# Patient Record
Sex: MALE | Race: BLACK OR AFRICAN AMERICAN | NOT HISPANIC OR LATINO | URBAN - METROPOLITAN AREA
[De-identification: names, ages, dates, MRNs, and addresses within clinical notes are randomized per-mention and may not be internally consistent; named-entity substitution may affect disease eponyms.]

---

## 2017-01-06 ENCOUNTER — EMERGENCY (EMERGENCY)
Facility: HOSPITAL | Age: 52
LOS: 1 days | Discharge: HOME | End: 2017-01-06
Attending: EMERGENCY MEDICINE | Admitting: EMERGENCY MEDICINE
Payer: MEDICAID

## 2017-01-06 VITALS
HEART RATE: 87 BPM | DIASTOLIC BLOOD PRESSURE: 79 MMHG | RESPIRATION RATE: 18 BRPM | SYSTOLIC BLOOD PRESSURE: 114 MMHG | OXYGEN SATURATION: 98 % | TEMPERATURE: 98 F

## 2017-01-06 VITALS — WEIGHT: 190.04 LBS | HEIGHT: 71 IN

## 2017-01-06 LAB
ALBUMIN SERPL ELPH-MCNC: 4 G/DL — SIGNIFICANT CHANGE UP (ref 3.3–5.2)
ALP SERPL-CCNC: 58 U/L — SIGNIFICANT CHANGE UP (ref 40–120)
ALT FLD-CCNC: 24 U/L — SIGNIFICANT CHANGE UP
ANION GAP SERPL CALC-SCNC: 12 MMOL/L — SIGNIFICANT CHANGE UP (ref 5–17)
ANISOCYTOSIS BLD QL: SLIGHT — SIGNIFICANT CHANGE UP
APPEARANCE UR: CLEAR — SIGNIFICANT CHANGE UP
AST SERPL-CCNC: 43 U/L — HIGH
BACTERIA # UR AUTO: ABNORMAL
BASOPHILS # BLD AUTO: 0 K/UL — SIGNIFICANT CHANGE UP (ref 0–0.2)
BILIRUB SERPL-MCNC: 0.5 MG/DL — SIGNIFICANT CHANGE UP (ref 0.4–2)
BILIRUB UR-MCNC: NEGATIVE — SIGNIFICANT CHANGE UP
BUN SERPL-MCNC: 12 MG/DL — SIGNIFICANT CHANGE UP (ref 8–20)
CALCIUM SERPL-MCNC: 9.1 MG/DL — SIGNIFICANT CHANGE UP (ref 8.6–10.2)
CHLORIDE SERPL-SCNC: 97 MMOL/L — LOW (ref 98–107)
CO2 SERPL-SCNC: 23 MMOL/L — SIGNIFICANT CHANGE UP (ref 22–29)
COLOR SPEC: ABNORMAL
CREAT SERPL-MCNC: 1.06 MG/DL — SIGNIFICANT CHANGE UP (ref 0.5–1.3)
DIFF PNL FLD: ABNORMAL
ELLIPTOCYTES BLD QL SMEAR: SLIGHT — SIGNIFICANT CHANGE UP
EOSINOPHIL # BLD AUTO: 0.2 K/UL — SIGNIFICANT CHANGE UP (ref 0–0.5)
EOSINOPHIL NFR BLD AUTO: 2 % — SIGNIFICANT CHANGE UP (ref 0–6)
GLUCOSE SERPL-MCNC: 78 MG/DL — SIGNIFICANT CHANGE UP (ref 70–115)
GLUCOSE UR QL: NEGATIVE MG/DL — SIGNIFICANT CHANGE UP
HCT VFR BLD CALC: 40.9 % — LOW (ref 42–52)
HGB BLD-MCNC: 14.1 G/DL — SIGNIFICANT CHANGE UP (ref 14–18)
KETONES UR-MCNC: ABNORMAL
LEUKOCYTE ESTERASE UR-ACNC: ABNORMAL
LYMPHOCYTES # BLD AUTO: 1.9 K/UL — SIGNIFICANT CHANGE UP (ref 1–4.8)
LYMPHOCYTES # BLD AUTO: 22 % — SIGNIFICANT CHANGE UP (ref 20–55)
MCHC RBC-ENTMCNC: 22.8 PG — LOW (ref 27–31)
MCHC RBC-ENTMCNC: 34.5 G/DL — SIGNIFICANT CHANGE UP (ref 32–36)
MCV RBC AUTO: 66.2 FL — LOW (ref 80–94)
MICROCYTES BLD QL: SLIGHT — SIGNIFICANT CHANGE UP
MONOCYTES # BLD AUTO: 1.4 K/UL — HIGH (ref 0–0.8)
MONOCYTES NFR BLD AUTO: 15 % — HIGH (ref 3–10)
NEUTROPHILS # BLD AUTO: 5.2 K/UL — SIGNIFICANT CHANGE UP (ref 1.8–8)
NEUTROPHILS NFR BLD AUTO: 61 % — SIGNIFICANT CHANGE UP (ref 37–73)
NITRITE UR-MCNC: POSITIVE
OVALOCYTES BLD QL SMEAR: SLIGHT — SIGNIFICANT CHANGE UP
PH UR: 5 — SIGNIFICANT CHANGE UP (ref 4.8–8)
PLAT MORPH BLD: NORMAL — SIGNIFICANT CHANGE UP
PLATELET # BLD AUTO: 279 K/UL — SIGNIFICANT CHANGE UP (ref 150–400)
POIKILOCYTOSIS BLD QL AUTO: SLIGHT — SIGNIFICANT CHANGE UP
POTASSIUM SERPL-MCNC: 8.3 MMOL/L — CRITICAL HIGH (ref 3.5–5.3)
POTASSIUM SERPL-SCNC: 8.3 MMOL/L — CRITICAL HIGH (ref 3.5–5.3)
PROT SERPL-MCNC: 8.4 G/DL — SIGNIFICANT CHANGE UP (ref 6.6–8.7)
PROT UR-MCNC: 30 MG/DL
RBC # BLD: 6.18 M/UL — SIGNIFICANT CHANGE UP (ref 4.6–6.2)
RBC # FLD: 17.2 % — HIGH (ref 11–15.6)
RBC BLD AUTO: ABNORMAL
RBC CASTS # UR COMP ASSIST: SIGNIFICANT CHANGE UP /HPF (ref 0–4)
SODIUM SERPL-SCNC: 132 MMOL/L — LOW (ref 135–145)
SP GR SPEC: 1.02 — SIGNIFICANT CHANGE UP (ref 1.01–1.02)
UROBILINOGEN FLD QL: 1 MG/DL
WBC # BLD: 8.74 K/UL — SIGNIFICANT CHANGE UP (ref 4.8–10.8)
WBC # FLD AUTO: 8.74 K/UL — SIGNIFICANT CHANGE UP (ref 4.8–10.8)
WBC UR QL: SIGNIFICANT CHANGE UP

## 2017-01-06 PROCEDURE — 74177 CT ABD & PELVIS W/CONTRAST: CPT | Mod: 26

## 2017-01-06 PROCEDURE — 96374 THER/PROPH/DIAG INJ IV PUSH: CPT

## 2017-01-06 PROCEDURE — 80053 COMPREHEN METABOLIC PANEL: CPT

## 2017-01-06 PROCEDURE — 96375 TX/PRO/DX INJ NEW DRUG ADDON: CPT

## 2017-01-06 PROCEDURE — 99284 EMERGENCY DEPT VISIT MOD MDM: CPT

## 2017-01-06 PROCEDURE — 81001 URINALYSIS AUTO W/SCOPE: CPT

## 2017-01-06 PROCEDURE — 99284 EMERGENCY DEPT VISIT MOD MDM: CPT | Mod: 25

## 2017-01-06 PROCEDURE — 74177 CT ABD & PELVIS W/CONTRAST: CPT

## 2017-01-06 PROCEDURE — 85027 COMPLETE CBC AUTOMATED: CPT

## 2017-01-06 RX ORDER — L.ACIDOPH/B.ANIMALIS/B.LONGUM 15B CELL
1 CAPSULE ORAL
Qty: 30 | Refills: 0 | OUTPATIENT
Start: 2017-01-06 | End: 2017-02-05

## 2017-01-06 RX ORDER — L.ACIDOPH/B.ANIMALIS/B.LONGUM 15B CELL
1 CAPSULE ORAL
Qty: 30 | Refills: 0
Start: 2017-01-06 | End: 2017-02-18

## 2017-01-06 RX ORDER — METRONIDAZOLE 500 MG
1 TABLET ORAL
Qty: 42 | Refills: 0
Start: 2017-01-06 | End: 2017-02-02

## 2017-01-06 RX ORDER — CIPROFLOXACIN LACTATE 400MG/40ML
500 VIAL (ML) INTRAVENOUS ONCE
Qty: 0 | Refills: 0 | Status: COMPLETED | OUTPATIENT
Start: 2017-01-06 | End: 2017-01-06

## 2017-01-06 RX ORDER — METRONIDAZOLE 500 MG
1 TABLET ORAL
Qty: 42 | Refills: 0 | OUTPATIENT
Start: 2017-01-06 | End: 2017-01-20

## 2017-01-06 RX ORDER — METRONIDAZOLE 500 MG
500 TABLET ORAL ONCE
Qty: 0 | Refills: 0 | Status: COMPLETED | OUTPATIENT
Start: 2017-01-06 | End: 2017-01-06

## 2017-01-06 RX ORDER — MORPHINE SULFATE 50 MG/1
4 CAPSULE, EXTENDED RELEASE ORAL ONCE
Qty: 0 | Refills: 0 | Status: DISCONTINUED | OUTPATIENT
Start: 2017-01-06 | End: 2017-01-06

## 2017-01-06 RX ORDER — MOXIFLOXACIN HYDROCHLORIDE TABLETS, 400 MG 400 MG/1
1 TABLET, FILM COATED ORAL
Qty: 20 | Refills: 0 | OUTPATIENT
Start: 2017-01-06 | End: 2017-01-16

## 2017-01-06 RX ORDER — MOXIFLOXACIN HYDROCHLORIDE TABLETS, 400 MG 400 MG/1
1 TABLET, FILM COATED ORAL
Qty: 20 | Refills: 0
Start: 2017-01-06 | End: 2017-01-29

## 2017-01-06 RX ORDER — TRAMADOL HYDROCHLORIDE 50 MG/1
1 TABLET ORAL
Qty: 16 | Refills: 0
Start: 2017-01-06 | End: 2017-01-10

## 2017-01-06 RX ORDER — DIPHENHYDRAMINE HCL 50 MG
50 CAPSULE ORAL ONCE
Qty: 0 | Refills: 0 | Status: COMPLETED | OUTPATIENT
Start: 2017-01-06 | End: 2017-01-06

## 2017-01-06 RX ADMIN — Medication 500 MILLIGRAM(S): at 17:43

## 2017-01-06 RX ADMIN — MORPHINE SULFATE 4 MILLIGRAM(S): 50 CAPSULE, EXTENDED RELEASE ORAL at 15:31

## 2017-01-06 RX ADMIN — Medication 50 MILLIGRAM(S): at 15:34

## 2017-01-06 RX ADMIN — MORPHINE SULFATE 4 MILLIGRAM(S): 50 CAPSULE, EXTENDED RELEASE ORAL at 16:01

## 2017-01-06 RX ADMIN — Medication 40 MILLIGRAM(S): at 13:46

## 2017-01-06 NOTE — ED STATDOCS - PROGRESS NOTE DETAILS
abd/pel=rt sided periumbilical and RUQ pain with palp,guarding,no rebound,normal bowel sounds,no CVA pain with palp,no eccymosis,no mass or bruit--lungs=CTA---will f/u all testings----pt states that he last ate thursday pt agitated--stating that its taking too long to get cat scan--states that he is thinking of leaving and going to another hospital pt now demanding morphine--d/w pt I will give him something for pain--pt then stated that he was leaving to go to another hospital---pt left before iv could be removed---code flight called---will call police due to IV still being in arm pt returned--now wants CT done--as per dr bruce will do CT and pt will get morphine for pain CT=ascending colon uncomplicated diverticulitis---cipro,flagyl,tramadol and probiotic---f/u with GI---CMP was hemolized,no need to repeat as per dr bruce---d/w dr ha all results prior to discharge CT=ascending colon uncomplicated diverticulitis---cipro,flagyl,tramadol and probiotic---f/u with GI---CMP was hemolized,no need to repeat as per dr bruce---d/w dr ha all results prior to discharge--will f/u urine cx

## 2017-01-06 NOTE — ED STATDOCS - ATTENDING CONTRIBUTION TO CARE
I, Burton Lao, performed the initial face to face bedside interview with this patient regarding history of present illness, review of symptoms and relevant past medical, social and family history.  I completed an independent physical examination.  I was the provider who initially evaluated this patient.  The history, relevant review of systems, past medical and surgical history, medical decision making, and physical examination was documented by the scribe in my presence and I attest to the accuracy of the documentation. Follow-up on ordered tests (ie labs, radiologic studies) and re-evaluation of the patient's status has been communicated to the ACP.  Disposition of the patient will be based on test outcome and response to ED interventions.

## 2017-01-06 NOTE — ED ADULT NURSE NOTE - OBJECTIVE STATEMENT
pt arrived with right upper quad pain that started 6 days ago and has gotten worse since last night, pt with history of diverticulitis

## 2017-01-06 NOTE — ED STATDOCS - OBJECTIVE STATEMENT
50 y/o M pt w/ PMHx of dyspepsia presents to the ED c/o RUQ pain x6 days, worsening yesterday night. Pt states that his pain is constant, and waxing and waning. Pt was seen here 2 months ago for diverticulitis, and pt states the pain is comparable. Pt denies fatty food intolerance, N/V/D, or any other complaints. Pt is allergic to Iodine. PMD: Dr. Rodriguez. Pt states that he took pain medication to no relief. Pt's pain is worsened w/ deep breathing. 52 y/o M pt w/ PMHx of dyspepsia presents to the ED c/o RUQ pain x6 days, worsening yesterday night. Pt states that his pain is waxing and waning in intensity and worse at night.  Denies aggravating factors. Pt denies fatty food intolerance, fever, N/V/D, or any other complaints.Took antacid and nexium with some relief.  Pt was seen here 2 months ago for diverticulitis, and pt states the pain is comparable.  Pt is allergic to Iodine. PMD: Dr. Rodriguez.

## 2017-01-06 NOTE — ED ADULT NURSE REASSESSMENT NOTE - NS ED NURSE REASSESS COMMENT FT1
was informed by lab blood hemolyzed, dr bruce informed, as per dr bruce blood does not need to bwe redrawn

## 2017-01-06 NOTE — ED STATDOCS - NS ED MD SCRIBE ATTENDING SCRIBE SECTIONS
HIV/DISPOSITION/PAST MEDICAL/SURGICAL/SOCIAL HISTORY/VITAL SIGNS( Pullset)/PHYSICAL EXAM/REVIEW OF SYSTEMS/HISTORY OF PRESENT ILLNESS

## 2017-04-24 ENCOUNTER — TRANSCRIPTION ENCOUNTER (OUTPATIENT)
Age: 52
End: 2017-04-24

## 2018-01-16 ENCOUNTER — TRANSCRIPTION ENCOUNTER (OUTPATIENT)
Age: 53
End: 2018-01-16

## 2018-01-16 PROBLEM — R10.13 EPIGASTRIC PAIN: Chronic | Status: ACTIVE | Noted: 2017-01-11

## 2018-01-16 PROBLEM — K57.92 DIVERTICULITIS OF INTESTINE, PART UNSPECIFIED, WITHOUT PERFORATION OR ABSCESS WITHOUT BLEEDING: Chronic | Status: ACTIVE | Noted: 2017-01-11

## 2018-01-17 ENCOUNTER — OUTPATIENT (OUTPATIENT)
Dept: OUTPATIENT SERVICES | Facility: HOSPITAL | Age: 53
LOS: 1 days | End: 2018-01-17
Payer: MEDICAID

## 2018-01-17 ENCOUNTER — RESULT REVIEW (OUTPATIENT)
Age: 53
End: 2018-01-17

## 2018-01-17 DIAGNOSIS — K21.0 GASTRO-ESOPHAGEAL REFLUX DISEASE WITH ESOPHAGITIS: ICD-10-CM

## 2018-01-17 DIAGNOSIS — Z12.11 ENCOUNTER FOR SCREENING FOR MALIGNANT NEOPLASM OF COLON: ICD-10-CM

## 2018-01-17 PROCEDURE — 88342 IMHCHEM/IMCYTCHM 1ST ANTB: CPT

## 2018-01-17 PROCEDURE — 88305 TISSUE EXAM BY PATHOLOGIST: CPT

## 2018-01-17 PROCEDURE — 88342 IMHCHEM/IMCYTCHM 1ST ANTB: CPT | Mod: 26

## 2018-01-17 PROCEDURE — 88305 TISSUE EXAM BY PATHOLOGIST: CPT | Mod: 26

## 2018-01-17 PROCEDURE — 43239 EGD BIOPSY SINGLE/MULTIPLE: CPT

## 2018-01-17 PROCEDURE — G0121: CPT

## 2018-01-19 LAB — SURGICAL PATHOLOGY FINAL REPORT - CH: SIGNIFICANT CHANGE UP

## 2018-03-01 ENCOUNTER — OUTPATIENT (OUTPATIENT)
Dept: OUTPATIENT SERVICES | Facility: HOSPITAL | Age: 53
LOS: 1 days | End: 2018-03-01
Payer: MEDICAID

## 2018-03-01 PROCEDURE — G9001: CPT

## 2018-03-16 DIAGNOSIS — R69 ILLNESS, UNSPECIFIED: ICD-10-CM

## 2021-04-20 PROBLEM — Z00.00 ENCOUNTER FOR PREVENTIVE HEALTH EXAMINATION: Status: ACTIVE | Noted: 2021-04-20

## 2021-05-04 ENCOUNTER — APPOINTMENT (OUTPATIENT)
Dept: COLORECTAL SURGERY | Facility: CLINIC | Age: 56
End: 2021-05-04
Payer: COMMERCIAL

## 2021-05-04 VITALS
HEART RATE: 82 BPM | HEIGHT: 71 IN | TEMPERATURE: 97.9 F | RESPIRATION RATE: 16 BRPM | WEIGHT: 182 LBS | DIASTOLIC BLOOD PRESSURE: 73 MMHG | BODY MASS INDEX: 25.48 KG/M2 | SYSTOLIC BLOOD PRESSURE: 110 MMHG

## 2021-05-04 DIAGNOSIS — F17.200 NICOTINE DEPENDENCE, UNSPECIFIED, UNCOMPLICATED: ICD-10-CM

## 2021-05-04 DIAGNOSIS — Z82.49 FAMILY HISTORY OF ISCHEMIC HEART DISEASE AND OTHER DISEASES OF THE CIRCULATORY SYSTEM: ICD-10-CM

## 2021-05-04 DIAGNOSIS — R10.32 LEFT LOWER QUADRANT PAIN: ICD-10-CM

## 2021-05-04 DIAGNOSIS — Z86.010 PERSONAL HISTORY OF COLONIC POLYPS: ICD-10-CM

## 2021-05-04 DIAGNOSIS — K57.32 DIVERTICULITIS OF LARGE INTESTINE W/OUT PERFORATION OR ABSCESS W/OUT BLEEDING: ICD-10-CM

## 2021-05-04 DIAGNOSIS — Z72.89 OTHER PROBLEMS RELATED TO LIFESTYLE: ICD-10-CM

## 2021-05-04 PROCEDURE — 99204 OFFICE O/P NEW MOD 45 MIN: CPT

## 2021-05-04 PROCEDURE — 99072 ADDL SUPL MATRL&STAF TM PHE: CPT

## 2021-05-04 NOTE — DATA REVIEWED
[FreeTextEntry1] : Previous CAT scan demonstrates diverticulitis\par Colonoscopy demonstrates diverticulosis

## 2021-05-04 NOTE — PHYSICAL EXAM
[Abdomen Tenderness] : ~T ~M Abdominal tenderness [Normal Breath Sounds] : Normal breath sounds [Normal Heart Sounds] : normal heart sounds [Normal Rate and Rhythm] : normal rate and rhythm [No Rash or Lesion] : No rash or lesion [Alert] : alert [Oriented to Person] : oriented to person [Oriented to Place] : oriented to place [Oriented to Time] : oriented to time [Calm] : calm [Abdomen Masses] : No abdominal masses [JVD] : no jugular venous distention  [de-identified] : llq quadrant discomfort [de-identified] : Looks in moderate distress , of stated age. [de-identified] : pupils equal reactive to light normocephalic atraumatic. [de-identified] : moves all 4 extremities appropriately with 5 over 5 strength

## 2021-05-04 NOTE — REVIEW OF SYSTEMS
[Feeling Poorly] : feeling poorly [Feeling Tired] : feeling tired [As Noted in HPI] : as noted in HPI [Abdominal Pain] : abdominal pain [Constipation] : constipation [Negative] : Heme/Lymph [FreeTextEntry7] : Chronic left lower quadrant abdominal pain

## 2021-05-04 NOTE — HISTORY OF PRESENT ILLNESS
[FreeTextEntry1] : consultation by Dr. Modi for recurring episodes of sigmoid diverticulitis and chronic left lower quadrant abdominal pain. 56-year-old male who had multiple bouts of sigmoid diverticulitis, 10 episodes over the last 10 years has developed chronic left lower quadrant abdominal pain symptoms have been worsening

## 2021-05-04 NOTE — ASSESSMENT
[FreeTextEntry1] : 56-year-old male with recurrent episodes of sigmoid diverticulitis and now chronic left lower quadrant abdominal pain. Recommend repeat CAT scan abdomen and pelvis p.o. contrast patient has a severe allergy to IV. Recommend colonoscopy. Recommend colonoscopy. Risks and benefits of colonoscopy have been discussed which include but not limited to bleeding, perforation, missing a cancer or polyp occurring 5%.\par Recommend laparoscopic possible open sigmoid colon resection.Risk and benefits of the surgery have been discussed which include bleeding, infection, sepsis, multiorgan failure, inadvertent injury including hollow viscus, solid organ, ureter neurovascular and neurological structures, DVT PE, heart attack, stroke, hernias, recurrence, leakage of anastomosis requiring reoperation possible stoma and death.

## 2021-05-05 PROBLEM — Z86.010 HISTORY OF COLONIC POLYPS: Status: RESOLVED | Noted: 2021-05-04 | Resolved: 2021-05-05

## 2021-05-05 PROBLEM — Z72.89 ALCOHOL USE: Status: ACTIVE | Noted: 2021-05-04

## 2021-05-08 ENCOUNTER — RESULT REVIEW (OUTPATIENT)
Age: 56
End: 2021-05-08

## 2021-05-10 ENCOUNTER — APPOINTMENT (OUTPATIENT)
Dept: COLORECTAL SURGERY | Facility: CLINIC | Age: 56
End: 2021-05-10
Payer: COMMERCIAL

## 2021-05-10 DIAGNOSIS — Z01.818 ENCOUNTER FOR OTHER PREPROCEDURAL EXAMINATION: ICD-10-CM

## 2021-05-10 PROCEDURE — 45378 DIAGNOSTIC COLONOSCOPY: CPT

## 2021-05-12 ENCOUNTER — NON-APPOINTMENT (OUTPATIENT)
Age: 56
End: 2021-05-12

## 2021-05-12 ENCOUNTER — APPOINTMENT (OUTPATIENT)
Dept: CT IMAGING | Facility: CLINIC | Age: 56
End: 2021-05-12
Payer: COMMERCIAL

## 2021-05-12 ENCOUNTER — OUTPATIENT (OUTPATIENT)
Dept: OUTPATIENT SERVICES | Facility: HOSPITAL | Age: 56
LOS: 1 days | End: 2021-05-12

## 2021-05-12 DIAGNOSIS — K57.32 DIVERTICULITIS OF LARGE INTESTINE WITHOUT PERFORATION OR ABSCESS WITHOUT BLEEDING: ICD-10-CM

## 2021-05-12 PROCEDURE — 74176 CT ABD & PELVIS W/O CONTRAST: CPT | Mod: 26

## 2021-05-13 ENCOUNTER — RESULT REVIEW (OUTPATIENT)
Age: 56
End: 2021-05-13

## 2021-05-13 ENCOUNTER — OUTPATIENT (OUTPATIENT)
Dept: OUTPATIENT SERVICES | Facility: HOSPITAL | Age: 56
LOS: 1 days | End: 2021-05-13
Payer: COMMERCIAL

## 2021-05-13 VITALS
HEIGHT: 71 IN | WEIGHT: 188.05 LBS | DIASTOLIC BLOOD PRESSURE: 65 MMHG | RESPIRATION RATE: 16 BRPM | HEART RATE: 95 BPM | SYSTOLIC BLOOD PRESSURE: 108 MMHG | OXYGEN SATURATION: 100 % | TEMPERATURE: 98 F

## 2021-05-13 DIAGNOSIS — Z29.9 ENCOUNTER FOR PROPHYLACTIC MEASURES, UNSPECIFIED: ICD-10-CM

## 2021-05-13 DIAGNOSIS — Z01.818 ENCOUNTER FOR OTHER PREPROCEDURAL EXAMINATION: ICD-10-CM

## 2021-05-13 DIAGNOSIS — K57.32 DIVERTICULITIS OF LARGE INTESTINE WITHOUT PERFORATION OR ABSCESS WITHOUT BLEEDING: ICD-10-CM

## 2021-05-13 DIAGNOSIS — Z98.890 OTHER SPECIFIED POSTPROCEDURAL STATES: Chronic | ICD-10-CM

## 2021-05-13 LAB
A1C WITH ESTIMATED AVERAGE GLUCOSE RESULT: 6.2 % — HIGH (ref 4–5.6)
ALBUMIN SERPL ELPH-MCNC: 3 G/DL — LOW (ref 3.3–5)
ALP SERPL-CCNC: 86 U/L — SIGNIFICANT CHANGE UP (ref 40–120)
ALT FLD-CCNC: 29 U/L — SIGNIFICANT CHANGE UP (ref 12–78)
ANION GAP SERPL CALC-SCNC: 4 MMOL/L — LOW (ref 5–17)
APTT BLD: 31.6 SEC — SIGNIFICANT CHANGE UP (ref 27.5–35.5)
AST SERPL-CCNC: 11 U/L — LOW (ref 15–37)
BASOPHILS # BLD AUTO: 0 K/UL — SIGNIFICANT CHANGE UP (ref 0–0.2)
BASOPHILS NFR BLD AUTO: 0 % — SIGNIFICANT CHANGE UP (ref 0–2)
BILIRUB SERPL-MCNC: 0.2 MG/DL — SIGNIFICANT CHANGE UP (ref 0.2–1.2)
BUN SERPL-MCNC: 6 MG/DL — LOW (ref 7–23)
CALCIUM SERPL-MCNC: 8.4 MG/DL — LOW (ref 8.5–10.1)
CHLORIDE SERPL-SCNC: 107 MMOL/L — SIGNIFICANT CHANGE UP (ref 96–108)
CO2 SERPL-SCNC: 27 MMOL/L — SIGNIFICANT CHANGE UP (ref 22–31)
CREAT SERPL-MCNC: 0.93 MG/DL — SIGNIFICANT CHANGE UP (ref 0.5–1.3)
EOSINOPHIL # BLD AUTO: 2.24 K/UL — HIGH (ref 0–0.5)
EOSINOPHIL NFR BLD AUTO: 23 % — HIGH (ref 0–6)
ESTIMATED AVERAGE GLUCOSE: 131 MG/DL — HIGH (ref 68–114)
GLUCOSE SERPL-MCNC: 101 MG/DL — HIGH (ref 70–99)
HCT VFR BLD CALC: 37.8 % — LOW (ref 39–50)
HGB BLD-MCNC: 12.1 G/DL — LOW (ref 13–17)
INR BLD: 1.21 RATIO — HIGH (ref 0.88–1.16)
LYMPHOCYTES # BLD AUTO: 1.66 K/UL — SIGNIFICANT CHANGE UP (ref 1–3.3)
LYMPHOCYTES # BLD AUTO: 17 % — SIGNIFICANT CHANGE UP (ref 13–44)
MCHC RBC-ENTMCNC: 22.2 PG — LOW (ref 27–34)
MCHC RBC-ENTMCNC: 32 GM/DL — SIGNIFICANT CHANGE UP (ref 32–36)
MCV RBC AUTO: 69.4 FL — LOW (ref 80–100)
MONOCYTES # BLD AUTO: 0.49 K/UL — SIGNIFICANT CHANGE UP (ref 0–0.9)
MONOCYTES NFR BLD AUTO: 5 % — SIGNIFICANT CHANGE UP (ref 2–14)
NEUTROPHILS # BLD AUTO: 5.36 K/UL — SIGNIFICANT CHANGE UP (ref 1.8–7.4)
NEUTROPHILS NFR BLD AUTO: 55 % — SIGNIFICANT CHANGE UP (ref 43–77)
NRBC # BLD: SIGNIFICANT CHANGE UP /100 WBCS (ref 0–0)
PLATELET # BLD AUTO: 199 K/UL — SIGNIFICANT CHANGE UP (ref 150–400)
POTASSIUM SERPL-MCNC: 3.5 MMOL/L — SIGNIFICANT CHANGE UP (ref 3.5–5.3)
POTASSIUM SERPL-SCNC: 3.5 MMOL/L — SIGNIFICANT CHANGE UP (ref 3.5–5.3)
PROT SERPL-MCNC: 6.4 GM/DL — SIGNIFICANT CHANGE UP (ref 6–8.3)
PROTHROM AB SERPL-ACNC: 14 SEC — HIGH (ref 10.6–13.6)
RBC # BLD: 5.45 M/UL — SIGNIFICANT CHANGE UP (ref 4.2–5.8)
RBC # FLD: 17.1 % — HIGH (ref 10.3–14.5)
SODIUM SERPL-SCNC: 138 MMOL/L — SIGNIFICANT CHANGE UP (ref 135–145)
WBC # BLD: 9.74 K/UL — SIGNIFICANT CHANGE UP (ref 3.8–10.5)
WBC # FLD AUTO: 9.74 K/UL — SIGNIFICANT CHANGE UP (ref 3.8–10.5)

## 2021-05-13 PROCEDURE — 71046 X-RAY EXAM CHEST 2 VIEWS: CPT | Mod: 26

## 2021-05-13 PROCEDURE — 86901 BLOOD TYPING SEROLOGIC RH(D): CPT

## 2021-05-13 PROCEDURE — 93010 ELECTROCARDIOGRAM REPORT: CPT

## 2021-05-13 PROCEDURE — 36415 COLL VENOUS BLD VENIPUNCTURE: CPT

## 2021-05-13 PROCEDURE — 71046 X-RAY EXAM CHEST 2 VIEWS: CPT

## 2021-05-13 PROCEDURE — 93005 ELECTROCARDIOGRAM TRACING: CPT

## 2021-05-13 PROCEDURE — 85610 PROTHROMBIN TIME: CPT

## 2021-05-13 PROCEDURE — 86850 RBC ANTIBODY SCREEN: CPT

## 2021-05-13 PROCEDURE — G0463: CPT | Mod: 25

## 2021-05-13 PROCEDURE — 82248 BILIRUBIN DIRECT: CPT

## 2021-05-13 PROCEDURE — 85730 THROMBOPLASTIN TIME PARTIAL: CPT

## 2021-05-13 PROCEDURE — 85025 COMPLETE CBC W/AUTO DIFF WBC: CPT

## 2021-05-13 PROCEDURE — 86900 BLOOD TYPING SEROLOGIC ABO: CPT

## 2021-05-13 PROCEDURE — 83036 HEMOGLOBIN GLYCOSYLATED A1C: CPT

## 2021-05-13 PROCEDURE — 80053 COMPREHEN METABOLIC PANEL: CPT

## 2021-05-13 NOTE — H&P PST ADULT - ASSESSMENT
56 year old male with diverticulitis; s/p antibiotic treatment denies abdominal pain or blood in stool; he presents to PST for planned laparoscopic possible open sigmoid colon resection     Plan:  1. PST instructions given ; NPO status instructions to be given by ASU   2. Pt instructed to take following meds with sip of water : none   3. Pt instructed to take routine evening medications unless indicated   4. Stop NSAIDS ( Aspirin Alev Motrin Mobic Diclofenac), herbal supplements , MVI , Vitamin fish oil 7 days prior to surgery  unless   directed by surgeon or cardiologist;   5. Medical Optimization  with NP Leena Modi   6. EZ wash instructions given   7. Labs EKG CXR as per surgeon request   8. Pt instructed to self quarantine after Covid test   9. Covid Testing scheduled Pt notified and aware  10. Pt denies covid symptoms shortness of breath fever cough       CAPRINI SCORE [CLOT]    AGE RELATED RISK FACTORS                                                       MOBILITY RELATED FACTORS  [x ] Age 41-60 years                                            (1 Point)                  [ ] Bed rest                                                        (1 Point)  [ ] Age: 61-74 years                                           (2 Points)                 [ ] Plaster cast                                                   (2 Points)  [ ] Age= 75 years                                              (3 Points)                 [ ] Bed bound for more than 72 hours                 (2 Points)    DISEASE RELATED RISK FACTORS                                               GENDER SPECIFIC FACTORS  [ ] Edema in the lower extremities                       (1 Point)                  [ ] Pregnancy                                                     (1 Point)  [ ] Varicose veins                                               (1 Point)                  [ ] Post-partum < 6 weeks                                   (1 Point)             [ x] BMI > 25 Kg/m2                                            (1 Point)                  [ ] Hormonal therapy  or oral contraception          (1 Point)                 [ ] Sepsis (in the previous month)                        (1 Point)                  [ ] History of pregnancy complications                 (1 point)  [ ] Pneumonia or serious lung disease                                               [ ] Unexplained or recurrent                     (1 Point)           (in the previous month)                               (1 Point)  [ ] Abnormal pulmonary function test                     (1 Point)                 SURGERY RELATED RISK FACTORS  [ ] Acute myocardial infarction                              (1 Point)                 [ ]  Section                                             (1 Point)  [ ] Congestive heart failure (in the previous month)  (1 Point)               [ ] Minor surgery                                                  (1 Point)   [x ] Inflammatory bowel disease                             (1 Point)                 [ ] Arthroscopic surgery                                        (2 Points)  [ ] Central venous access                                      (2 Points)                [x ] General surgery lasting more than 45 minutes   (2 Points)       [ ] Stroke (in the previous month)                          (5 Points)               [ ] Elective arthroplasty                                         (5 Points)            ( ) presents and past malignancy                           (2 points)                                                                                                         HEMATOLOGY RELATED FACTORS                                                 TRAUMA RELATED RISK FACTORS  [ ] Prior episodes of VTE                                     (3 Points)                 [ ] Fracture of the hip, pelvis, or leg                       (5 Points)  [ ] Positive family history for VTE                         (3 Points)                 [ ] Acute spinal cord injury (in the previous month)  (5 Points)  [ ] Prothrombin 53211 A                                     (3 Points)                 [ ] Paralysis  (less than 1 month)                             (5 Points)  [ ] Factor V Leiden                                             (3 Points)                  [ ] Multiple Trauma within 1 month                        (5 Points)  [ ] Lupus anticoagulants                                     (3 Points)                                                           [ ] Anticardiolipin antibodies                               (3 Points)                                                       [ ] High homocysteine in the blood                      (3 Points)                                             [ ] Other congenital or acquired thrombophilia      (3 Points)                                                [ ] Heparin induced thrombocytopenia                  (3 Points)                                          Total Score [   5       ]

## 2021-05-13 NOTE — H&P PST ADULT - HEALTH CARE MAINTENANCE
covid vaccine current moderna x2   [place] Pt denies  travel internationally for the past 21 days

## 2021-05-13 NOTE — H&P PST ADULT - NSICDXFAMILYHX_GEN_ALL_CORE_FT
FAMILY HISTORY:  Father  Still living? Unknown  Family history of benign spinal cord tumor, Age at diagnosis: Age Unknown  FH: hypertension, Age at diagnosis: Age Unknown    Mother  Still living? Unknown  FH: diabetes mellitus, Age at diagnosis: Age Unknown  FH: lung cancer, Age at diagnosis: Age Unknown

## 2021-05-13 NOTE — H&P PST ADULT - HISTORY OF PRESENT ILLNESS
56 year old male with diverticulitis; s/p antibiotic treatment denies abdominal pain or blood in stool; he presents to PST for planned laparoscopic possible open sigmoid colon resection

## 2021-05-13 NOTE — H&P PST ADULT - NSANTHOSAYNRD_GEN_A_CORE
No. LEODAN screening performed.  STOP BANG Legend: 0-2 = LOW Risk; 3-4 = INTERMEDIATE Risk; 5-8 = HIGH Risk

## 2021-05-14 ENCOUNTER — APPOINTMENT (OUTPATIENT)
Dept: DISASTER EMERGENCY | Facility: CLINIC | Age: 56
End: 2021-05-14

## 2021-05-14 DIAGNOSIS — Z01.818 ENCOUNTER FOR OTHER PREPROCEDURAL EXAMINATION: ICD-10-CM

## 2021-05-14 DIAGNOSIS — K57.32 DIVERTICULITIS OF LARGE INTESTINE WITHOUT PERFORATION OR ABSCESS WITHOUT BLEEDING: ICD-10-CM

## 2021-05-14 RX ORDER — ONDANSETRON 8 MG/1
4 TABLET, FILM COATED ORAL ONCE
Refills: 0 | Status: DISCONTINUED | OUTPATIENT
Start: 2021-05-17 | End: 2021-05-17

## 2021-05-14 RX ORDER — HYDROMORPHONE HYDROCHLORIDE 2 MG/ML
0.5 INJECTION INTRAMUSCULAR; INTRAVENOUS; SUBCUTANEOUS
Refills: 0 | Status: DISCONTINUED | OUTPATIENT
Start: 2021-05-17 | End: 2021-05-17

## 2021-05-14 RX ORDER — SODIUM CHLORIDE 9 MG/ML
1000 INJECTION, SOLUTION INTRAVENOUS
Refills: 0 | Status: DISCONTINUED | OUTPATIENT
Start: 2021-05-17 | End: 2021-05-17

## 2021-05-15 LAB — SARS-COV-2 N GENE NPH QL NAA+PROBE: NOT DETECTED

## 2021-05-17 ENCOUNTER — INPATIENT (INPATIENT)
Facility: HOSPITAL | Age: 56
LOS: 1 days | Discharge: HOME | DRG: 330 | End: 2021-05-19
Attending: COLON & RECTAL SURGERY | Admitting: COLON & RECTAL SURGERY
Payer: COMMERCIAL

## 2021-05-17 ENCOUNTER — RESULT REVIEW (OUTPATIENT)
Age: 56
End: 2021-05-17

## 2021-05-17 ENCOUNTER — APPOINTMENT (OUTPATIENT)
Dept: COLORECTAL SURGERY | Facility: HOSPITAL | Age: 56
End: 2021-05-17
Payer: COMMERCIAL

## 2021-05-17 VITALS
RESPIRATION RATE: 16 BRPM | TEMPERATURE: 98 F | WEIGHT: 188.05 LBS | OXYGEN SATURATION: 100 % | DIASTOLIC BLOOD PRESSURE: 67 MMHG | SYSTOLIC BLOOD PRESSURE: 104 MMHG | HEIGHT: 71 IN | HEART RATE: 55 BPM

## 2021-05-17 DIAGNOSIS — K57.32 DIVERTICULITIS OF LARGE INTESTINE WITHOUT PERFORATION OR ABSCESS WITHOUT BLEEDING: ICD-10-CM

## 2021-05-17 DIAGNOSIS — R10.32 LEFT LOWER QUADRANT PAIN: ICD-10-CM

## 2021-05-17 DIAGNOSIS — Z98.890 OTHER SPECIFIED POSTPROCEDURAL STATES: Chronic | ICD-10-CM

## 2021-05-17 LAB
APTT BLD: 32.2 SEC — SIGNIFICANT CHANGE UP (ref 27.5–35.5)
INR BLD: 1.18 RATIO — HIGH (ref 0.88–1.16)
PROTHROM AB SERPL-ACNC: 13.7 SEC — HIGH (ref 10.6–13.6)

## 2021-05-17 PROCEDURE — 44213 LAP MOBIL SPLENIC FL ADD-ON: CPT | Mod: AS

## 2021-05-17 PROCEDURE — 87077 CULTURE AEROBIC IDENTIFY: CPT

## 2021-05-17 PROCEDURE — 36415 COLL VENOUS BLD VENIPUNCTURE: CPT

## 2021-05-17 PROCEDURE — 44207 L COLECTOMY/COLOPROCTOSTOMY: CPT | Mod: AS

## 2021-05-17 PROCEDURE — 45300 PROCTOSIGMOIDOSCOPY DX: CPT

## 2021-05-17 PROCEDURE — 88304 TISSUE EXAM BY PATHOLOGIST: CPT | Mod: 26

## 2021-05-17 PROCEDURE — 44207 L COLECTOMY/COLOPROCTOSTOMY: CPT

## 2021-05-17 PROCEDURE — 88307 TISSUE EXAM BY PATHOLOGIST: CPT | Mod: 26

## 2021-05-17 PROCEDURE — 44640 REPAIR BOWEL-SKIN FISTULA: CPT | Mod: AS

## 2021-05-17 PROCEDURE — 85610 PROTHROMBIN TIME: CPT

## 2021-05-17 PROCEDURE — 87070 CULTURE OTHR SPECIMN AEROBIC: CPT

## 2021-05-17 PROCEDURE — 87075 CULTR BACTERIA EXCEPT BLOOD: CPT

## 2021-05-17 PROCEDURE — 85730 THROMBOPLASTIN TIME PARTIAL: CPT

## 2021-05-17 PROCEDURE — 85027 COMPLETE CBC AUTOMATED: CPT

## 2021-05-17 PROCEDURE — 99232 SBSQ HOSP IP/OBS MODERATE 35: CPT

## 2021-05-17 PROCEDURE — 83735 ASSAY OF MAGNESIUM: CPT

## 2021-05-17 PROCEDURE — 80048 BASIC METABOLIC PNL TOTAL CA: CPT

## 2021-05-17 PROCEDURE — 88307 TISSUE EXAM BY PATHOLOGIST: CPT

## 2021-05-17 PROCEDURE — 44640 REPAIR BOWEL-SKIN FISTULA: CPT

## 2021-05-17 PROCEDURE — 82962 GLUCOSE BLOOD TEST: CPT

## 2021-05-17 PROCEDURE — 49020 DRAINAGE ABDOM ABSCESS OPEN: CPT

## 2021-05-17 PROCEDURE — C1889: CPT

## 2021-05-17 PROCEDURE — 49020 DRAINAGE ABDOM ABSCESS OPEN: CPT | Mod: AS

## 2021-05-17 PROCEDURE — 44213 LAP MOBIL SPLENIC FL ADD-ON: CPT

## 2021-05-17 PROCEDURE — 84100 ASSAY OF PHOSPHORUS: CPT

## 2021-05-17 PROCEDURE — 88304 TISSUE EXAM BY PATHOLOGIST: CPT

## 2021-05-17 PROCEDURE — 87186 SC STD MICRODIL/AGAR DIL: CPT

## 2021-05-17 RX ORDER — ALVIMOPAN 12 MG/1
12 CAPSULE ORAL ONCE
Refills: 0 | Status: COMPLETED | OUTPATIENT
Start: 2021-05-17 | End: 2021-05-17

## 2021-05-17 RX ORDER — HEPARIN SODIUM 5000 [USP'U]/ML
5000 INJECTION INTRAVENOUS; SUBCUTANEOUS ONCE
Refills: 0 | Status: COMPLETED | OUTPATIENT
Start: 2021-05-17 | End: 2021-05-17

## 2021-05-17 RX ORDER — OXYCODONE HYDROCHLORIDE 5 MG/1
10 TABLET ORAL EVERY 4 HOURS
Refills: 0 | Status: DISCONTINUED | OUTPATIENT
Start: 2021-05-17 | End: 2021-05-19

## 2021-05-17 RX ORDER — CEFOTETAN DISODIUM 1 G
2 VIAL (EA) INJECTION ONCE
Refills: 0 | Status: COMPLETED | OUTPATIENT
Start: 2021-05-18 | End: 2021-05-18

## 2021-05-17 RX ORDER — SODIUM CHLORIDE 9 MG/ML
1000 INJECTION, SOLUTION INTRAVENOUS
Refills: 0 | Status: DISCONTINUED | OUTPATIENT
Start: 2021-05-17 | End: 2021-05-19

## 2021-05-17 RX ORDER — ALVIMOPAN 12 MG/1
12 CAPSULE ORAL
Refills: 0 | Status: DISCONTINUED | OUTPATIENT
Start: 2021-05-17 | End: 2021-05-19

## 2021-05-17 RX ORDER — OXYCODONE HYDROCHLORIDE 5 MG/1
5 TABLET ORAL EVERY 4 HOURS
Refills: 0 | Status: DISCONTINUED | OUTPATIENT
Start: 2021-05-17 | End: 2021-05-19

## 2021-05-17 RX ORDER — ACETAMINOPHEN 500 MG
1000 TABLET ORAL EVERY 6 HOURS
Refills: 0 | Status: COMPLETED | OUTPATIENT
Start: 2021-05-17 | End: 2021-05-18

## 2021-05-17 RX ORDER — HEPARIN SODIUM 5000 [USP'U]/ML
5000 INJECTION INTRAVENOUS; SUBCUTANEOUS EVERY 8 HOURS
Refills: 0 | Status: DISCONTINUED | OUTPATIENT
Start: 2021-05-18 | End: 2021-05-19

## 2021-05-17 RX ORDER — ACETAMINOPHEN 500 MG
650 TABLET ORAL EVERY 6 HOURS
Refills: 0 | Status: DISCONTINUED | OUTPATIENT
Start: 2021-05-17 | End: 2021-05-19

## 2021-05-17 RX ADMIN — HYDROMORPHONE HYDROCHLORIDE 0.5 MILLIGRAM(S): 2 INJECTION INTRAMUSCULAR; INTRAVENOUS; SUBCUTANEOUS at 16:23

## 2021-05-17 RX ADMIN — HEPARIN SODIUM 5000 UNIT(S): 5000 INJECTION INTRAVENOUS; SUBCUTANEOUS at 08:11

## 2021-05-17 RX ADMIN — Medication 1000 MILLIGRAM(S): at 16:23

## 2021-05-17 RX ADMIN — Medication 400 MILLIGRAM(S): at 16:00

## 2021-05-17 RX ADMIN — Medication 400 MILLIGRAM(S): at 22:53

## 2021-05-17 RX ADMIN — ALVIMOPAN 12 MILLIGRAM(S): 12 CAPSULE ORAL at 22:54

## 2021-05-17 RX ADMIN — HYDROMORPHONE HYDROCHLORIDE 0.5 MILLIGRAM(S): 2 INJECTION INTRAMUSCULAR; INTRAVENOUS; SUBCUTANEOUS at 12:35

## 2021-05-17 RX ADMIN — Medication 1000 MILLIGRAM(S): at 23:34

## 2021-05-17 RX ADMIN — ALVIMOPAN 12 MILLIGRAM(S): 12 CAPSULE ORAL at 08:27

## 2021-05-17 RX ADMIN — SODIUM CHLORIDE 125 MILLILITER(S): 9 INJECTION, SOLUTION INTRAVENOUS at 22:53

## 2021-05-17 NOTE — CONSULT NOTE ADULT - SUBJECTIVE AND OBJECTIVE BOX
PCP:  Dr. Marla Cesar  Colorectal:  Se    CHIEF COMPLAINT:   left lower quadrant pain    HISTORY OF THE PRESENT ILLNESS:  56 M with Hx of Diverticulosis complicated by several episodes of recurrent sigmoid diverticulitis, approx 10 episodes over the last 10 years associated with chronic LLQ pain.  His symptoms have been getting progressively worse and he has been on several rounds of antibiotic treatments.  He was re-evaluated and a decision was made to undergo laproscopic sigmoid resection with Dr. Saez today.  The patient is now in the PACU and vitals have been stable post-op.    PAST MEDICAL HISTORY:  Recurrent Sigmoid Diverticulitis  Sigmoid Diverticulosis  Vitamin D deficiency    PAST SURGICAL HISTORY:  NONE    FAMILY HISTORY:    Mother - aortic aneursym    SOCIAL HISTORY:  + smoking, no alcohol, no drugs    REVIEW OF SYSTEMS:   All 10 systems reviewed in detailed and found to be negative with the exception of what has already been described above    MEDICATIONS  (STANDING):  acetaminophen  IVPB .. 1000 milliGRAM(s) IV Intermittent every 6 hours  alvimopan 12 milliGRAM(s) Oral two times a day  lactated ringers. 1000 milliLiter(s) (75 mL/Hr) IV Continuous <Continuous>  lactated ringers. 1000 milliLiter(s) (125 mL/Hr) IV Continuous <Continuous>    MEDICATIONS  (PRN):  acetaminophen   Tablet .. 650 milliGRAM(s) Oral every 6 hours PRN Temp greater or equal to 38C (100.4F), Mild Pain (1 - 3)  HYDROmorphone  Injectable 0.5 milliGRAM(s) IV Push every 10 minutes PRN Moderate Pain (4 - 6)  ondansetron Injectable 4 milliGRAM(s) IV Push once PRN Nausea and/or Vomiting  oxyCODONE    IR 5 milliGRAM(s) Oral every 4 hours PRN Moderate Pain (4 - 6)  oxyCODONE    IR 10 milliGRAM(s) Oral every 4 hours PRN Severe Pain (7 - 10)    VITALS:  T(F): 97.8 (05-17-21 @ 12:06), Max: 97.8 (05-17-21 @ 12:06)  HR: 74 (05-17-21 @ 15:00) (55 - 79)  BP: 127/76 (05-17-21 @ 14:30) (104/67 - 138/82)  RR: 14 (05-17-21 @ 15:00) (14 - 21)  SpO2: 99% (05-17-21 @ 15:00) (94% - 100%)  I&O's Summary    17 May 2021 07:01  -  17 May 2021 14:54  --------------------------------------------------------  IN: 1700 mL / OUT: 60 mL / NET: 1640 mL    CAPILLARY BLOOD GLUCOSE  POCT Blood Glucose.: 136 mg/dL (17 May 2021 12:08)  POCT Blood Glucose.: 115 mg/dL (17 May 2021 09:56)  POCT Blood Glucose.: 79 mg/dL (17 May 2021 07:29)    PHYSICAL EXAM:  HEENT:  pupils equal and reactive, EOMI, no oropharyngeal lesions, erythema, exudates, oral thrush  NECK:   supple, no carotid bruits, no palpable lymph nodes, no thyromegaly  CV:  +S1, +S2, regular, no murmurs or rubs  RESP:   lungs clear to auscultation bilaterally, no wheezing, rales, rhonchi, good air entry bilaterally  BREAST:  not examined  GI:  abdomen soft, non-tender, non-distended, normal BS, no bruits, no abdominal masses, no palpable masses  RECTAL:  not examined  :  not examined  MSK:   normal muscle tone, no atrophy, no rigidity, no contractions  EXT:  no clubbing, no cyanosis, no edema, no calf pain, swelling or erythema  VASCULAR:  pulses equal and symmetric in the upper and lower extremities  NEURO:  AAOX3, no focal neurological deficits, follows all commands, able to move extremities spontaneously  SKIN:  no ulcers, lesions or rashes    LABS:  Pre-op labs reviewed    PT/INR - ( 17 May 2021 07:47 )   PT: 13.7 sec;   INR: 1.18 ratio      PTT - ( 17 May 2021 07:47 )  PTT:32.2 sec    IMPRESSION:        RECOMMENDATIONS:     PCP:  Dr. Marla Cesar  Colorectal:  Se    CHIEF COMPLAINT:   left lower quadrant pain    HISTORY OF THE PRESENT ILLNESS:  56 M with Hx of Diverticulosis complicated by several episodes of recurrent sigmoid diverticulitis, approx 10 episodes over the last 10 years associated with chronic LLQ pain.  His symptoms have been getting progressively worse and he has been on several rounds of antibiotic treatments.  He was re-evaluated and a decision was made to undergo laproscopic sigmoid resection with Dr. Saez today.  The patient is now in the PACU and vitals have been stable post-op.    PAST MEDICAL HISTORY:  Recurrent Sigmoid Diverticulitis  Sigmoid Diverticulosis  Vitamin D deficiency  Pre-Diabetes, HgA1c 6.2%    PAST SURGICAL HISTORY:  NONE    FAMILY HISTORY:    Mother - aortic aneursym, diabetes, lung cancer  Father - spinal cord tumor    SOCIAL HISTORY:  + smoking, no alcohol, no drugs    REVIEW OF SYSTEMS:   All 10 systems reviewed in detailed and found to be negative with the exception of what has already been described above    MEDICATIONS  (STANDING):  acetaminophen  IVPB .. 1000 milliGRAM(s) IV Intermittent every 6 hours  alvimopan 12 milliGRAM(s) Oral two times a day  lactated ringers. 1000 milliLiter(s) (75 mL/Hr) IV Continuous <Continuous>  lactated ringers. 1000 milliLiter(s) (125 mL/Hr) IV Continuous <Continuous>    MEDICATIONS  (PRN):  acetaminophen   Tablet .. 650 milliGRAM(s) Oral every 6 hours PRN Temp greater or equal to 38C (100.4F), Mild Pain (1 - 3)  HYDROmorphone  Injectable 0.5 milliGRAM(s) IV Push every 10 minutes PRN Moderate Pain (4 - 6)  ondansetron Injectable 4 milliGRAM(s) IV Push once PRN Nausea and/or Vomiting  oxyCODONE    IR 5 milliGRAM(s) Oral every 4 hours PRN Moderate Pain (4 - 6)  oxyCODONE    IR 10 milliGRAM(s) Oral every 4 hours PRN Severe Pain (7 - 10)    VITALS:  T(F): 97.8 (05-17-21 @ 12:06), Max: 97.8 (05-17-21 @ 12:06)  HR: 74 (05-17-21 @ 15:00) (55 - 79)  BP: 127/76 (05-17-21 @ 14:30) (104/67 - 138/82)  RR: 14 (05-17-21 @ 15:00) (14 - 21)  SpO2: 99% (05-17-21 @ 15:00) (94% - 100%)  I&O's Summary    17 May 2021 07:01  -  17 May 2021 14:54  --------------------------------------------------------  IN: 1700 mL / OUT: 60 mL / NET: 1640 mL    CAPILLARY BLOOD GLUCOSE  POCT Blood Glucose.: 136 mg/dL (17 May 2021 12:08)  POCT Blood Glucose.: 115 mg/dL (17 May 2021 09:56)  POCT Blood Glucose.: 79 mg/dL (17 May 2021 07:29)    PHYSICAL EXAM:  HEENT:  pupils equal and reactive, EOMI, no oropharyngeal lesions, erythema, exudates, oral thrush  NECK:   supple, no carotid bruits, no palpable lymph nodes, no thyromegaly  CV:  +S1, +S2, regular, no murmurs or rubs  RESP:   lungs clear to auscultation bilaterally, no wheezing, rales, rhonchi, good air entry bilaterally  BREAST:  not examined  GI:  abdomen soft, non-tender, non-distended, normal BS, no bruits, no abdominal masses, no palpable masses  RECTAL:  not examined  :  not examined  MSK:   normal muscle tone, no atrophy, no rigidity, no contractions  EXT:  no clubbing, no cyanosis, no edema, no calf pain, swelling or erythema  VASCULAR:  pulses equal and symmetric in the upper and lower extremities  NEURO:  AAOX3, no focal neurological deficits, follows all commands, able to move extremities spontaneously  SKIN:  no ulcers, lesions or rashes    LABS:  Pre-op labs reviewed    PT/INR - ( 17 May 2021 07:47 )   PT: 13.7 sec;   INR: 1.18 ratio      PTT - ( 17 May 2021 07:47 )  PTT:32.2 sec    IMPRESSION:    S/P ELECTIVE LAPROSCOPIC SIGMOID RESECTION FOR RECURRENT COMPLICATED DIVERTICULITIS AND CHRONIC ABDOMINAL PAIN, POD #0, EBL~50CC    SMOKING DEPENDENCE    PRE-DIABETES, HgA1c - 6.2%      RECOMMENDATIONS:  -  pain control  -  incentive spirometry  -  wound care per colorectal  -  OOB to chair  -  strict Is/Os, voiding trial in am  -  complete perioperative ABXs  -  clear liquids for now per colorectal  -  Entereg  -  DVT prophylaxis - SQ Heparin and venodynes  -  check labs in am  -  Nicotine patch if needed  -  will follow with you    d/w patient and PACU RN   PCP:  Dr. Marla Cesar  Colorectal:  Se    CHIEF COMPLAINT:   left lower quadrant pain    HISTORY OF THE PRESENT ILLNESS:  56 M with Hx of Diverticulosis complicated by several episodes of recurrent sigmoid diverticulitis, approx 10 episodes over the last 10 years associated with chronic LLQ pain.  His symptoms have been getting progressively worse and he has been on several rounds of antibiotic treatments.  He was re-evaluated and a decision was made to undergo laproscopic sigmoid resection with Dr. Saez today.  The patient is now in the PACU and vitals have been stable post-op.    PAST MEDICAL HISTORY:  Recurrent Sigmoid Diverticulitis  Sigmoid Diverticulosis  Vitamin D deficiency  Pre-Diabetes, HgA1c 6.2%    PAST SURGICAL HISTORY:  NONE    FAMILY HISTORY:    Mother - aortic aneursym, diabetes, lung cancer  Father - spinal cord tumor    SOCIAL HISTORY:  + smoking (stopped recently about 3 months ago), no alcohol, no drugs    REVIEW OF SYSTEMS:   All 10 systems reviewed in detailed and found to be negative with the exception of what has already been described above    MEDICATIONS  (STANDING):  acetaminophen  IVPB .. 1000 milliGRAM(s) IV Intermittent every 6 hours  alvimopan 12 milliGRAM(s) Oral two times a day  lactated ringers. 1000 milliLiter(s) (75 mL/Hr) IV Continuous <Continuous>  lactated ringers. 1000 milliLiter(s) (125 mL/Hr) IV Continuous <Continuous>    MEDICATIONS  (PRN):  acetaminophen   Tablet .. 650 milliGRAM(s) Oral every 6 hours PRN Temp greater or equal to 38C (100.4F), Mild Pain (1 - 3)  HYDROmorphone  Injectable 0.5 milliGRAM(s) IV Push every 10 minutes PRN Moderate Pain (4 - 6)  ondansetron Injectable 4 milliGRAM(s) IV Push once PRN Nausea and/or Vomiting  oxyCODONE    IR 5 milliGRAM(s) Oral every 4 hours PRN Moderate Pain (4 - 6)  oxyCODONE    IR 10 milliGRAM(s) Oral every 4 hours PRN Severe Pain (7 - 10)    VITALS:  T(F): 97.8 (05-17-21 @ 12:06), Max: 97.8 (05-17-21 @ 12:06)  HR: 74 (05-17-21 @ 15:00) (55 - 79)  BP: 127/76 (05-17-21 @ 14:30) (104/67 - 138/82)  RR: 14 (05-17-21 @ 15:00) (14 - 21)  SpO2: 99% (05-17-21 @ 15:00) (94% - 100%)  I&O's Summary    17 May 2021 07:01  -  17 May 2021 14:54  --------------------------------------------------------  IN: 1700 mL / OUT: 60 mL / NET: 1640 mL    CAPILLARY BLOOD GLUCOSE  POCT Blood Glucose.: 136 mg/dL (17 May 2021 12:08)  POCT Blood Glucose.: 115 mg/dL (17 May 2021 09:56)  POCT Blood Glucose.: 79 mg/dL (17 May 2021 07:29)    PHYSICAL EXAM:  HEENT:  pupils equal and reactive, EOMI, no oropharyngeal lesions, erythema, exudates, oral thrush  NECK:   supple, no carotid bruits, no palpable lymph nodes, no thyromegaly  CV:  +S1, +S2, regular, no murmurs or rubs  RESP:   lungs clear to auscultation bilaterally, no wheezing, rales, rhonchi, good air entry bilaterally  BREAST:  not examined  GI:  abdomen soft, + tenderness with light palpation, lower horizontal wound with vac dressing, + hypoactive BS  RECTAL:  not examined  :  + Cali catheter  MSK:   normal muscle tone, no atrophy, no rigidity, no contractions  EXT:  no clubbing, no cyanosis, no edema, no calf pain, swelling or erythema  VASCULAR:  pulses equal and symmetric in the upper and lower extremities  NEURO:  AAOX3, no focal neurological deficits, follows all commands, able to move extremities spontaneously  SKIN:  no ulcers, lesions or rashes    LABS:  Pre-op labs reviewed    PT/INR - ( 17 May 2021 07:47 )   PT: 13.7 sec;   INR: 1.18 ratio      PTT - ( 17 May 2021 07:47 )  PTT:32.2 sec    IMPRESSION:    S/P ELECTIVE LAPROSCOPIC SIGMOID RESECTION FOR RECURRENT COMPLICATED DIVERTICULITIS AND CHRONIC ABDOMINAL PAIN, POD #0, EBL~50CC    PRE-DIABETES, HgA1c - 6.2%      RECOMMENDATIONS:  -  pain control  -  incentive spirometry  -  wound care per colorectal  -  OOB to chair  -  strict Is/Os, voiding trial in am  -  complete perioperative ABXs  -  clear liquids for now per colorectal  -  Entereg  -  DVT prophylaxis - SQ Heparin and venodynes  -  check labs in am  -  follow up pathology  -  will follow with you    d/w patient and PACU RN

## 2021-05-17 NOTE — BRIEF OPERATIVE NOTE - NSICDXBRIEFPREOP_GEN_ALL_CORE_FT
PRE-OP DIAGNOSIS:  Perforation of sigmoid colon due to diverticulitis 17-May-2021 12:20:23  Robbi Alberto A

## 2021-05-17 NOTE — BRIEF OPERATIVE NOTE - OPERATION/FINDINGS
sigmoid diverticulitis with abscess perforation, sigmoid adherent to left lower pelvis sidewall and bladder.

## 2021-05-17 NOTE — BRIEF OPERATIVE NOTE - NSICDXBRIEFPOSTOP_GEN_ALL_CORE_FT
POST-OP DIAGNOSIS:  Perforation of sigmoid colon due to diverticulitis 17-May-2021 12:20:26  Robbi Alberto  Abscess of sigmoid colon due to diverticulitis 17-May-2021 12:20:54  Robbi Alberto

## 2021-05-17 NOTE — BRIEF OPERATIVE NOTE - NSICDXBRIEFPROCEDURE_GEN_ALL_CORE_FT
PROCEDURES:  Hand assisted laparoscopic sigmoidectomy 17-May-2021 12:18:15  Robbi Alberto  Mobilization of splenic flexure performed during partial colectomy 17-May-2021 12:18:33  Robbi Alberto  Rigid proctosigmoidoscopy 17-May-2021 12:19:58  Robbi Alberto

## 2021-05-18 ENCOUNTER — TRANSCRIPTION ENCOUNTER (OUTPATIENT)
Age: 56
End: 2021-05-18

## 2021-05-18 LAB
ANION GAP SERPL CALC-SCNC: 5 MMOL/L — SIGNIFICANT CHANGE UP (ref 5–17)
BUN SERPL-MCNC: 8 MG/DL — SIGNIFICANT CHANGE UP (ref 7–23)
CALCIUM SERPL-MCNC: 8.5 MG/DL — SIGNIFICANT CHANGE UP (ref 8.5–10.1)
CHLORIDE SERPL-SCNC: 109 MMOL/L — HIGH (ref 96–108)
CO2 SERPL-SCNC: 25 MMOL/L — SIGNIFICANT CHANGE UP (ref 22–31)
CREAT SERPL-MCNC: 0.95 MG/DL — SIGNIFICANT CHANGE UP (ref 0.5–1.3)
GLUCOSE SERPL-MCNC: 99 MG/DL — SIGNIFICANT CHANGE UP (ref 70–99)
HCT VFR BLD CALC: 32.5 % — LOW (ref 39–50)
HGB BLD-MCNC: 10.5 G/DL — LOW (ref 13–17)
MAGNESIUM SERPL-MCNC: 2 MG/DL — SIGNIFICANT CHANGE UP (ref 1.6–2.6)
MCHC RBC-ENTMCNC: 21.7 PG — LOW (ref 27–34)
MCHC RBC-ENTMCNC: 32.3 GM/DL — SIGNIFICANT CHANGE UP (ref 32–36)
MCV RBC AUTO: 67.1 FL — LOW (ref 80–100)
PHOSPHATE SERPL-MCNC: 2.2 MG/DL — LOW (ref 2.5–4.5)
PLATELET # BLD AUTO: 215 K/UL — SIGNIFICANT CHANGE UP (ref 150–400)
POTASSIUM SERPL-MCNC: 3.6 MMOL/L — SIGNIFICANT CHANGE UP (ref 3.5–5.3)
POTASSIUM SERPL-SCNC: 3.6 MMOL/L — SIGNIFICANT CHANGE UP (ref 3.5–5.3)
RBC # BLD: 4.84 M/UL — SIGNIFICANT CHANGE UP (ref 4.2–5.8)
RBC # FLD: 16.5 % — HIGH (ref 10.3–14.5)
SODIUM SERPL-SCNC: 139 MMOL/L — SIGNIFICANT CHANGE UP (ref 135–145)
WBC # BLD: 9.04 K/UL — SIGNIFICANT CHANGE UP (ref 3.8–10.5)
WBC # FLD AUTO: 9.04 K/UL — SIGNIFICANT CHANGE UP (ref 3.8–10.5)

## 2021-05-18 PROCEDURE — 99232 SBSQ HOSP IP/OBS MODERATE 35: CPT

## 2021-05-18 RX ADMIN — HEPARIN SODIUM 5000 UNIT(S): 5000 INJECTION INTRAVENOUS; SUBCUTANEOUS at 22:21

## 2021-05-18 RX ADMIN — HEPARIN SODIUM 5000 UNIT(S): 5000 INJECTION INTRAVENOUS; SUBCUTANEOUS at 06:00

## 2021-05-18 RX ADMIN — Medication 400 MILLIGRAM(S): at 12:32

## 2021-05-18 RX ADMIN — OXYCODONE HYDROCHLORIDE 5 MILLIGRAM(S): 5 TABLET ORAL at 17:35

## 2021-05-18 RX ADMIN — ALVIMOPAN 12 MILLIGRAM(S): 12 CAPSULE ORAL at 22:19

## 2021-05-18 RX ADMIN — HEPARIN SODIUM 5000 UNIT(S): 5000 INJECTION INTRAVENOUS; SUBCUTANEOUS at 13:25

## 2021-05-18 RX ADMIN — Medication 400 MILLIGRAM(S): at 06:01

## 2021-05-18 RX ADMIN — ALVIMOPAN 12 MILLIGRAM(S): 12 CAPSULE ORAL at 11:05

## 2021-05-18 RX ADMIN — Medication 100 GRAM(S): at 00:11

## 2021-05-18 RX ADMIN — Medication 1000 MILLIGRAM(S): at 12:47

## 2021-05-18 RX ADMIN — Medication 400 MILLIGRAM(S): at 17:34

## 2021-05-18 NOTE — DISCHARGE NOTE PROVIDER - HOSPITAL COURSE
S/p sigmoid colon resection for diverticulitis, postop course uneventful, good pain control, passing BMs, flatus. S/p sigmoid colon resection for diverticulitis, postop course uneventful, good pain control, passing flatus.

## 2021-05-18 NOTE — DISCHARGE NOTE PROVIDER - CARE PROVIDER_API CALL
Jeffy Jordan)  ColonRectal Surgery; Surgery  [address] [address]  Phone: [phone]  Fax: [phone]  Follow Up Time: 2 weeks

## 2021-05-18 NOTE — DISCHARGE NOTE PROVIDER - NSDCCPTREATMENT_GEN_ALL_CORE_FT
PRINCIPAL PROCEDURE  Procedure: Hand assisted laparoscopic sigmoidectomy  Findings and Treatment:       SECONDARY PROCEDURE  Procedure: Rigid proctosigmoidoscopy  Findings and Treatment:     Procedure: Mobilization of splenic flexure performed during partial colectomy  Findings and Treatment:

## 2021-05-18 NOTE — PROGRESS NOTE ADULT - SUBJECTIVE AND OBJECTIVE BOX
PCP:  Dr. Marla Cesar  Colorectal:  Se    CHIEF COMPLAINT:   left lower quadrant pain    HISTORY OF THE PRESENT ILLNESS:  56 M with Hx of Diverticulosis complicated by several episodes of recurrent sigmoid diverticulitis, approx 10 episodes over the last 10 years associated with chronic LLQ pain.  His symptoms have been getting progressively worse and he has been on several rounds of antibiotic treatments.  He was re-evaluated and a decision was made to undergo laproscopic sigmoid resection with Dr. Saez       5/18: seen at bedside, without c/o, no N/V, daniel po      REVIEW OF SYSTEMS:   All 10 systems reviewed in detailed and found to be negative with the exception of what has already been described above    MEDICATIONS  (STANDING):  acetaminophen  IVPB .. 1000 milliGRAM(s) IV Intermittent every 6 hours  alvimopan 12 milliGRAM(s) Oral two times a day  heparin   Injectable 5000 Unit(s) SubCutaneous every 8 hours  lactated ringers. 1000 milliLiter(s) (125 mL/Hr) IV Continuous <Continuous>    MEDICATIONS  (PRN):  acetaminophen   Tablet .. 650 milliGRAM(s) Oral every 6 hours PRN Temp greater or equal to 38C (100.4F), Mild Pain (1 - 3)  oxyCODONE    IR 5 milliGRAM(s) Oral every 4 hours PRN Moderate Pain (4 - 6)  oxyCODONE    IR 10 milliGRAM(s) Oral every 4 hours PRN Severe Pain (7 - 10)    Vital Signs Last 24 Hrs  T(C): 36.9 (05-18-21 @ 10:09), Max: 37 (05-18-21 @ 00:15)  T(F): 98.5 (05-18-21 @ 10:09), Max: 98.6 (05-18-21 @ 00:15)  HR: 81 (05-18-21 @ 10:09) (67 - 90)  BP: 111/61 (05-18-21 @ 10:09) (111/61 - 138/82)  BP(mean): --  RR: 16 (05-18-21 @ 10:09) (14 - 21)  SpO2: 97% (05-18-21 @ 10:09) (94% - 99%)      CAPILLARY BLOOD GLUCOSE    POCT Blood Glucose (05.18.21 @ 06:03)   POCT Blood Glucose.: 111 mg/dL   POCT Blood Glucose (05.18.21 @ 00:10)   POCT Blood Glucose.: 154 mg/dL   POCT Blood Glucose (05.17.21 @ 18:42)   POCT Blood Glucose.: 136 mg/dL (17 May 2021 12:08)  POCT Blood Glucose.: 115 mg/dL (17 May 2021 09:56)  POCT Blood Glucose.: 79 mg/dL (17 May 2021 07:29)    PHYSICAL EXAM:    GENERAL: Comfortable, no acute distress   HEAD:  Normocephalic, atraumatic  EYES: EOMI, PERRLA  HEENT: Moist mucous membranes  NECK: Supple, No JVD  NERVOUS SYSTEM:  Alert & Oriented X3, Motor Strength 5/5 B/L upper and lower extremities  CHEST/LUNG: Clear to auscultation bilaterally  HEART: Regular rate and rhythm  ABDOMEN: Soft, appropriate post op tenderness Nondistended, Bowel sounds present, + pervena, lap sites c/d/i  GENITOURINARY: Voiding, no palpable bladder  EXTREMITIES:   No clubbing, cyanosis, or edema  MUSCULOSKELETAL- No muscle tenderness, no joint tenderness  SKIN-no rash            LABS:                            10.5   9.04  )-----------( 215      ( 18 May 2021 07:01 )             32.5       05-18    139  |  109<H>  |  8   ----------------------------<  99  3.6   |  25  |  0.95    Ca    8.5      18 May 2021 07:01  Phos  2.2     05-18  Mg     2.0     05-18          IMPRESSION:    S/P ELECTIVE LAPROSCOPIC SIGMOID RESECTION FOR RECURRENT COMPLICATED DIVERTICULITIS AND CHRONIC ABDOMINAL PAIN,   POD#1    RECOMMENDATIONS:  -  pain control  -  incentive spirometry  -  wound care per colorectal  -  OOB to chair  - monitor I&O's  -  complete perioperative ABXs  -  clear liquids for now per colorectal  -  Entereg  -  DVT prophylaxis - SQ Heparin and venodynes  -  check labs in am  -  follow up pathology  -  will follow with you       PCP:  Dr. Marla Cesar  Colorectal:  Se    CHIEF COMPLAINT:   left lower quadrant pain    HISTORY OF THE PRESENT ILLNESS:  56 M with Hx of Diverticulosis complicated by several episodes of recurrent sigmoid diverticulitis, approx 10 episodes over the last 10 years associated with chronic LLQ pain.  His symptoms have been getting progressively worse and he has been on several rounds of antibiotic treatments.  He was re-evaluated and a decision was made to undergo laproscopic sigmoid resection with Dr. Saez       5/18: seen at bedside, without c/o, no N/V, daniel liquids      REVIEW OF SYSTEMS:   All 10 systems reviewed in detailed and found to be negative with the exception of what has already been described above    MEDICATIONS  (STANDING):  acetaminophen  IVPB .. 1000 milliGRAM(s) IV Intermittent every 6 hours  alvimopan 12 milliGRAM(s) Oral two times a day  heparin   Injectable 5000 Unit(s) SubCutaneous every 8 hours  lactated ringers. 1000 milliLiter(s) (125 mL/Hr) IV Continuous <Continuous>    MEDICATIONS  (PRN):  acetaminophen   Tablet .. 650 milliGRAM(s) Oral every 6 hours PRN Temp greater or equal to 38C (100.4F), Mild Pain (1 - 3)  oxyCODONE    IR 5 milliGRAM(s) Oral every 4 hours PRN Moderate Pain (4 - 6)  oxyCODONE    IR 10 milliGRAM(s) Oral every 4 hours PRN Severe Pain (7 - 10)    Vital Signs Last 24 Hrs  T(C): 36.9 (05-18-21 @ 10:09), Max: 37 (05-18-21 @ 00:15)  T(F): 98.5 (05-18-21 @ 10:09), Max: 98.6 (05-18-21 @ 00:15)  HR: 81 (05-18-21 @ 10:09) (67 - 90)  BP: 111/61 (05-18-21 @ 10:09) (111/61 - 138/82)  RR: 16 (05-18-21 @ 10:09) (14 - 21)  SpO2: 97% (05-18-21 @ 10:09) (94% - 99%)      PHYSICAL EXAM:    GENERAL: Comfortable, no acute distress   HEAD:  Normocephalic, atraumatic  EYES: EOMI, PERRLA  HEENT: Moist mucous membranes  NECK: Supple, No JVD  NERVOUS SYSTEM:  Alert & Oriented X3, Motor Strength 5/5 B/L upper and lower extremities  CHEST/LUNG: Clear to auscultation bilaterally  HEART: Regular rate and rhythm  ABDOMEN: Soft, appropriate post op tenderness Nondistended, Bowel sounds present, + pervena, lap sites c/d/i  GENITOURINARY: Voiding, no palpable bladder  EXTREMITIES:   No clubbing, cyanosis, or edema  MUSCULOSKELETAL- No muscle tenderness, no joint tenderness  SKIN-no rash            LABS:                            10.5   9.04  )-----------( 215      ( 18 May 2021 07:01 )             32.5       05-18    139  |  109<H>  |  8   ----------------------------<  99  3.6   |  25  |  0.95    Ca    8.5      18 May 2021 07:01  Phos  2.2     05-18  Mg     2.0     05-18          IMPRESSION:    S/P ELECTIVE LAPROSCOPIC SIGMOID RESECTION FOR RECURRENT COMPLICATED DIVERTICULITIS AND CHRONIC ABDOMINAL PAIN,   POD#1    RECOMMENDATIONS:  -  pain control  -  incentive spirometry  -  wound care per colorectal  -  OOB to chair  -  monitor I&O's  -  complete perioperative ABXs  -  clear liquids for now per colorectal  -  Entereg  -  DVT prophylaxis - SQ Heparin and venodynes  -  check labs in am  -  follow up pathology  -  will follow with you

## 2021-05-18 NOTE — PROGRESS NOTE ADULT - SUBJECTIVE AND OBJECTIVE BOX
Pt was seen and examined at bedside this morning with surgery team. No acute overnight events reported by nursing staff. Pt offers no new complaints at this time. States he tolerated diet and voided after Cali removal. Admits to passing flatus, denies BMs. Denies fever/cp/sob/n/v/d/c. Vitals stable.     T(C): 36.9 (05-18-21 @ 10:09), Max: 37 (05-18-21 @ 00:15)  HR: 81 (05-18-21 @ 10:09) (74 - 90)  BP: 111/61 (05-18-21 @ 10:09) (111/61 - 134/99)  RR: 16 (05-18-21 @ 10:09) (14 - 19)  SpO2: 97% (05-18-21 @ 10:09) (96% - 99%)    Physical Exam:  General: AAOx3, in NAD  HEENT: NC/At, EOMI  Cardio: S1S2, RRR  Pulm: equal air entry b/l, non labored on RA  Abdomen: soft, appropriate tenderness at incision sites, prevena in place, dressing c/d/i                          10.5   9.04  )-----------( 215      ( 18 May 2021 07:01 )             32.5     05-18    139  |  109<H>  |  8   ----------------------------<  99  3.6   |  25  |  0.95    Ca    8.5      18 May 2021 07:01  Phos  2.2     05-18  Mg     2.0     05-18            Pt is a     -Monitor vitals  -Diet:  -Pain control prn  -Anti emetic prn  -Continue IVF  -Continue IVAbx  -Serial abd exams  -Monitor daily labs   -Encourage oob/ambulation  -Encourage IS use  -DVT/GI ppx    Will discuss with , surgery attedning Pt was seen and examined at bedside this morning with surgery team. No acute overnight events reported by nursing staff. Pt offers no new complaints at this time. States he tolerated diet and voided after Cali removal. Admits to passing flatus, denies BMs. Denies fever/cp/sob/n/v/d/c. Vitals stable.     T(C): 36.9 (05-18-21 @ 10:09), Max: 37 (05-18-21 @ 00:15)  HR: 81 (05-18-21 @ 10:09) (74 - 90)  BP: 111/61 (05-18-21 @ 10:09) (111/61 - 134/99)  RR: 16 (05-18-21 @ 10:09) (14 - 19)  SpO2: 97% (05-18-21 @ 10:09) (96% - 99%)    Physical Exam:  General: AAOx3, in NAD  HEENT: NC/At, EOMI  Cardio: S1S2, RRR  Pulm: equal air entry b/l, non labored on RA  Abdomen: soft, appropriate tenderness at incision sites, prevena in place, dressing c/d/i                          10.5   9.04  )-----------( 215      ( 18 May 2021 07:01 )             32.5     05-18    139  |  109<H>  |  8   ----------------------------<  99  3.6   |  25  |  0.95    Ca    8.5      18 May 2021 07:01  Phos  2.2     05-18  Mg     2.0     05-18

## 2021-05-18 NOTE — DISCHARGE NOTE PROVIDER - NSDCCPCAREPLAN_GEN_ALL_CORE_FT
PRINCIPAL DISCHARGE DIAGNOSIS  Diagnosis: Abscess of sigmoid colon due to diverticulitis  Assessment and Plan of Treatment:

## 2021-05-19 ENCOUNTER — TRANSCRIPTION ENCOUNTER (OUTPATIENT)
Age: 56
End: 2021-05-19

## 2021-05-19 VITALS
RESPIRATION RATE: 18 BRPM | OXYGEN SATURATION: 96 % | DIASTOLIC BLOOD PRESSURE: 85 MMHG | SYSTOLIC BLOOD PRESSURE: 127 MMHG | HEART RATE: 70 BPM | TEMPERATURE: 99 F

## 2021-05-19 LAB
ANION GAP SERPL CALC-SCNC: 2 MMOL/L — LOW (ref 5–17)
BUN SERPL-MCNC: 5 MG/DL — LOW (ref 7–23)
CALCIUM SERPL-MCNC: 8 MG/DL — LOW (ref 8.5–10.1)
CHLORIDE SERPL-SCNC: 110 MMOL/L — HIGH (ref 96–108)
CO2 SERPL-SCNC: 28 MMOL/L — SIGNIFICANT CHANGE UP (ref 22–31)
CREAT SERPL-MCNC: 0.81 MG/DL — SIGNIFICANT CHANGE UP (ref 0.5–1.3)
GLUCOSE SERPL-MCNC: 84 MG/DL — SIGNIFICANT CHANGE UP (ref 70–99)
HCT VFR BLD CALC: 31.9 % — LOW (ref 39–50)
HGB BLD-MCNC: 10.4 G/DL — LOW (ref 13–17)
MAGNESIUM SERPL-MCNC: 1.8 MG/DL — SIGNIFICANT CHANGE UP (ref 1.6–2.6)
MCHC RBC-ENTMCNC: 22 PG — LOW (ref 27–34)
MCHC RBC-ENTMCNC: 32.6 GM/DL — SIGNIFICANT CHANGE UP (ref 32–36)
MCV RBC AUTO: 67.4 FL — LOW (ref 80–100)
PHOSPHATE SERPL-MCNC: 3.4 MG/DL — SIGNIFICANT CHANGE UP (ref 2.5–4.5)
PLATELET # BLD AUTO: 218 K/UL — SIGNIFICANT CHANGE UP (ref 150–400)
POTASSIUM SERPL-MCNC: 3.7 MMOL/L — SIGNIFICANT CHANGE UP (ref 3.5–5.3)
POTASSIUM SERPL-SCNC: 3.7 MMOL/L — SIGNIFICANT CHANGE UP (ref 3.5–5.3)
RBC # BLD: 4.73 M/UL — SIGNIFICANT CHANGE UP (ref 4.2–5.8)
RBC # FLD: 16.5 % — HIGH (ref 10.3–14.5)
SODIUM SERPL-SCNC: 140 MMOL/L — SIGNIFICANT CHANGE UP (ref 135–145)
WBC # BLD: 7.31 K/UL — SIGNIFICANT CHANGE UP (ref 3.8–10.5)
WBC # FLD AUTO: 7.31 K/UL — SIGNIFICANT CHANGE UP (ref 3.8–10.5)

## 2021-05-19 PROCEDURE — 99232 SBSQ HOSP IP/OBS MODERATE 35: CPT

## 2021-05-19 RX ADMIN — HEPARIN SODIUM 5000 UNIT(S): 5000 INJECTION INTRAVENOUS; SUBCUTANEOUS at 16:21

## 2021-05-19 RX ADMIN — HEPARIN SODIUM 5000 UNIT(S): 5000 INJECTION INTRAVENOUS; SUBCUTANEOUS at 05:59

## 2021-05-19 RX ADMIN — OXYCODONE HYDROCHLORIDE 5 MILLIGRAM(S): 5 TABLET ORAL at 16:22

## 2021-05-19 RX ADMIN — OXYCODONE HYDROCHLORIDE 5 MILLIGRAM(S): 5 TABLET ORAL at 17:31

## 2021-05-19 RX ADMIN — OXYCODONE HYDROCHLORIDE 10 MILLIGRAM(S): 5 TABLET ORAL at 01:53

## 2021-05-19 RX ADMIN — OXYCODONE HYDROCHLORIDE 10 MILLIGRAM(S): 5 TABLET ORAL at 02:39

## 2021-05-19 RX ADMIN — ALVIMOPAN 12 MILLIGRAM(S): 12 CAPSULE ORAL at 11:26

## 2021-05-19 NOTE — PROGRESS NOTE ADULT - REASON FOR ADMISSION
elective colon resection for recurrent episodes of diverticulitis
Diverticular disease
elective colon resection for recurrent episodes of diverticulitis

## 2021-05-19 NOTE — DISCHARGE NOTE NURSING/CASE MANAGEMENT/SOCIAL WORK - PATIENT PORTAL LINK FT
You can access the FollowMyHealth Patient Portal offered by [facility] by registering at the following website: [email or web address]. By joining [facility] FollowMyHealth portal, you will also be able to view your health information using other applications (apps) compatible with our system.

## 2021-05-19 NOTE — PROGRESS NOTE ADULT - SUBJECTIVE AND OBJECTIVE BOX
Day 2 s/p sigmoid resection for diverticular disease  Doing fine, pain is tolerated  Passing gas, no bowel movement yet  Tolerating fluid diet    Vitals:  T(C): 37 ( @ 05:30), Max: 37.2 ( @ 00:53)  HR: 64 ( @ 05:30) (64 - 81)  BP: 133/79 ( @ 05:30) (111/61 - 133/79)  RR: 18 ( @ 05:30) (16 - 18)  SpO2: 96% ( @ 05:30) (95% - 98%)     @ 07:01  -   @ 07:00  --------------------------------------------------------  IN:  Total IN: 0 mL    OUT:    Voided (mL): 3275 mL  Total OUT: 3275 mL    Total NET: -3275 mL          Physical Exam:  Pt is AAOx3  General: Well developed, in no acute distress.   Chest: Lungs clear, no rales, no rhonchi, no wheezes.   Heart: RR, no murmurs, no rubs, no gallops.   Abdomen: Soft, no tenderness, no masses, BS normal.    Back: Normal curvature, no tenderness.   Neuro: Physiological, no localizing findings.   Skin: Normal, no rashes, no lesions noted.   Extremities: Warm, well perfused, no edema, Pulses intact     @ 06:13                    10.4  CBC: 7.31>)-------(<218                     31.9                 140 | 110 | 5    CMP:  ----------------------< 84               3.7 | 28 | 0.81                      Ca:8.0  Phos:3.4  M.8               -|      |-        LFTs:  ------|-|-----             -|      |-   @ 07:01                    10.5  CBC: 9.04>)-------(<215                     32.5                 139 | 109 | 8    CMP:  ----------------------< 99               3.6 | 25 | 0.95                      Ca:8.5  Phos:2.2  M.0               -|      |-        LFTs:  ------|-|-----             -|      |-      Culture - Abscess with Gram Stain (collected 21 @ 09:41)  Source: .Abscess pericolonic pelvic abscess  Preliminary Report (21 @ 07:58):    Rare Escherichia coli  Organism: Escherichia coli (21 @ 07:57)  Organism: Escherichia coli (21 @ 07:57)      -  Amikacin: S <=16      -  Amoxicillin/Clavulanic Acid: S <=8/4      -  Ampicillin: S <=8 These ampicillin results predict results for amoxicillin      -  Ampicillin/Sulbactam: S <=4/2 Enterobacter, Citrobacter, and Serratia may develop resistance during prolonged therapy (3-4 days)      -  Aztreonam: S <=4      -  Cefazolin: S <=2 Enterobacter, Citrobacter, and Serratia may develop resistance during prolonged therapy (3-4 days)      -  Cefepime: S <=2      -  Cefoxitin: S <=8      -  Ceftriaxone: S <=1 Enterobacter, Citrobacter, and Serratia may develop resistance during prolonged therapy      -  Ciprofloxacin: R >2      -  Ertapenem: S <=0.5      -  Gentamicin: S <=2      -  Imipenem: S <=1      -  Levofloxacin: R >4      -  Meropenem: S <=1      -  Piperacillin/Tazobactam: S <=8      -  Tobramycin: S <=2      -  Trimethoprim/Sulfamethoxazole: S <=0.5/9.5      Method Type: CL      Current Inpatient Medications:  acetaminophen   Tablet .. 650 milliGRAM(s) Oral every 6 hours PRN  alvimopan 12 milliGRAM(s) Oral two times a day  heparin   Injectable 5000 Unit(s) SubCutaneous every 8 hours  lactated ringers. 1000 milliLiter(s) (125 mL/Hr) IV Continuous <Continuous>  oxyCODONE    IR 5 milliGRAM(s) Oral every 4 hours PRN  oxyCODONE    IR 10 milliGRAM(s) Oral every 4 hours PRN

## 2021-05-19 NOTE — DISCHARGE NOTE NURSING/CASE MANAGEMENT/SOCIAL WORK - NSDCPNINST_GEN_ALL_CORE
Do not submerge in water, may shower and allow water to run down incisions and then pat dry.  Staples to be removed in surgical office. Continue low fiber diet.  Contact surgeon for fever greater than, chills, pain not relieved by pain meds and persistent nausea/vomiting.

## 2021-05-19 NOTE — PROGRESS NOTE ADULT - ASSESSMENT
A 56 year old male with diverticular disease  S/P sigmoid resection    Plan:  Advance diet  possible discharge today    Plan discussed with colorectal surgery group

## 2021-05-19 NOTE — PROGRESS NOTE ADULT - ATTENDING COMMENTS
Pt seen and examined this am with np emily arroyo. agree with documentation as above with changes made where appropriate.   pt admitted for lap sigmoid resection for recurrent diverticulitis.   Doing well. Ambulating. Tolerating liquids. +flatus.   can likely advance diet if ok with crs.
With flatus.  Reporting good pain control.      Vital Signs Last 24 Hrs  T(C): 36.9 (18 May 2021 10:09), Max: 37 (18 May 2021 00:15)  T(F): 98.5 (18 May 2021 10:09), Max: 98.6 (18 May 2021 00:15)  HR: 81 (18 May 2021 10:09) (74 - 90)  BP: 111/61 (18 May 2021 10:09) (111/61 - 134/99)  BP(mean): --  RR: 16 (18 May 2021 10:09) (14 - 19)  SpO2: 97% (18 May 2021 10:09) (96% - 99%)    Uo 3400/24h    NAD  Abd ND/soft/mild incisional tenderness  proveena intact    labs reviewed    imp: POD#1 s/p lap sigmoid resection for diverticulitis  --doing well.  d/c canchola.   --with flatus.  advance diet to fulls.  plan low fiber diet tomorrow.  --encourage ambulation/IS
with flatus, denies significant pain.  ambulating.  no BM.  no nausea.    Vital Signs Last 24 Hrs  T(C): 36.7 (19 May 2021 08:40), Max: 37.2 (19 May 2021 00:53)  T(F): 98.1 (19 May 2021 08:40), Max: 98.9 (19 May 2021 00:53)  HR: 74 (19 May 2021 08:40) (64 - 78)  BP: 123/93 (19 May 2021 08:40) (116/73 - 133/79)  BP(mean): 100 (19 May 2021 08:40) (100 - 100)  RR: 17 (19 May 2021 08:40) (17 - 18)  SpO2: 99% (19 May 2021 08:40) (95% - 99%)    Abd ND/soft/mild incisional tenderness    labs reviewed    imp:  POD#2 s/p lap sigmoid resection  --low fiber diet  --hep lock IVF,   --maybe home today

## 2021-05-19 NOTE — PROGRESS NOTE ADULT - SUBJECTIVE AND OBJECTIVE BOX
PCP:  Dr. Marla Cesar  Colorectal:  Se    CHIEF COMPLAINT:   left lower quadrant pain    HISTORY OF THE PRESENT ILLNESS:  56 M with Hx of Diverticulosis complicated by several episodes of recurrent sigmoid diverticulitis, approx 10 episodes over the last 10 years associated with chronic LLQ pain.  His symptoms have been getting progressively worse and he has been on several rounds of antibiotic treatments.  He was re-evaluated and a decision was made to undergo laproscopic sigmoid resection with Dr. Saez       5/18: seen at bedside, without c/o, no N/V, daniel liquids  5/19 has some pain, +flatus, +BM. Tolerating solid diet. AMbulated    REVIEW OF SYSTEMS:   All 10 systems reviewed in detailed and found to be negative with the exception of what has already been described above    MEDICATIONS  (STANDING):  MEDICATIONS  (STANDING):  alvimopan 12 milliGRAM(s) Oral two times a day  heparin   Injectable 5000 Unit(s) SubCutaneous every 8 hours    MEDICATIONS  (PRN):  MEDICATIONS  (PRN):  acetaminophen   Tablet .. 650 milliGRAM(s) Oral every 6 hours PRN Temp greater or equal to 38C (100.4F), Mild Pain (1 - 3)  oxyCODONE    IR 5 milliGRAM(s) Oral every 4 hours PRN Moderate Pain (4 - 6)  oxyCODONE    IR 10 milliGRAM(s) Oral every 4 hours PRN Severe Pain (7 - 10)    Vital Signs Last 24 Hrs  Vital Signs Last 24 Hrs  T(C): 37.1 (19 May 2021 15:24), Max: 37.2 (19 May 2021 00:53)  T(F): 98.7 (19 May 2021 15:24), Max: 98.9 (19 May 2021 00:53)  HR: 70 (19 May 2021 15:24) (64 - 77)  BP: 127/85 (19 May 2021 15:24) (123/93 - 133/79)  BP(mean): 100 (19 May 2021 08:40) (100 - 100)  RR: 18 (19 May 2021 15:24) (17 - 18)  SpO2: 96% (19 May 2021 15:24) (96% - 99%)    PHYSICAL EXAM:  GENERAL: Comfortable, no acute distress   HEAD:  Normocephalic, atraumatic  EYES: EOMI, PERRLA  HEENT: Moist mucous membranes  NECK: Supple, No JVD  NERVOUS SYSTEM:  Alert & Oriented X3, Motor Strength 5/5 B/L upper and lower extremities  CHEST/LUNG: Clear to auscultation bilaterally  HEART: Regular rate and rhythm  ABDOMEN: Soft, appropriate post op tenderness Nondistended, Bowel sounds present, + pervena, lap sites c/d/i  GENITOURINARY: Voiding, no palpable bladder  EXTREMITIES:   No clubbing, cyanosis, or edema  MUSCULOSKELETAL- No muscle tenderness, no joint tenderness  SKIN-no rash    LABS:                          10.4   7.31  )-----------( 218      ( 19 May 2021 06:13 )             31.9     19 May 2021 06:13    140    |  110    |  5      ----------------------------<  84     3.7     |  28     |  0.81     Ca    8.0        19 May 2021 06:13  Phos  3.4       19 May 2021 06:13  Mg     1.8       19 May 2021 06:13    CAPILLARY BLOOD GLUCOSE  POCT Blood Glucose.: 117 mg/dL (19 May 2021 15:15)  POCT Blood Glucose.: 85 mg/dL (19 May 2021 06:41)  POCT Blood Glucose.: 129 mg/dL (18 May 2021 23:29)  POCT Blood Glucose.: 90 mg/dL (18 May 2021 17:50)    IMPRESSION:    S/P ELECTIVE LAPROSCOPIC SIGMOID RESECTION FOR RECURRENT COMPLICATED DIVERTICULITIS AND CHRONIC ABDOMINAL PAIN,     RECOMMENDATIONS:  CRS f/u appreciated  OOB to ambulate  +flatus, +BM  Tolerating regular diet  Incentive spirometry  Voiding    #Dispo- likely home when clinically stable. D/w pt

## 2021-05-24 PROBLEM — K57.90 DIVERTICULOSIS OF INTESTINE, PART UNSPECIFIED, WITHOUT PERFORATION OR ABSCESS WITHOUT BLEEDING: Chronic | Status: ACTIVE | Noted: 2021-05-13

## 2021-05-26 DIAGNOSIS — N32.1 VESICOINTESTINAL FISTULA: ICD-10-CM

## 2021-05-26 DIAGNOSIS — K57.20 DIVERTICULITIS OF LARGE INTESTINE WITH PERFORATION AND ABSCESS WITHOUT BLEEDING: ICD-10-CM

## 2021-06-02 ENCOUNTER — APPOINTMENT (OUTPATIENT)
Dept: COLORECTAL SURGERY | Facility: CLINIC | Age: 56
End: 2021-06-02
Payer: COMMERCIAL

## 2021-06-02 VITALS
BODY MASS INDEX: 25.48 KG/M2 | HEART RATE: 80 BPM | HEIGHT: 71 IN | RESPIRATION RATE: 16 BRPM | WEIGHT: 182 LBS | TEMPERATURE: 97.5 F | DIASTOLIC BLOOD PRESSURE: 75 MMHG | SYSTOLIC BLOOD PRESSURE: 115 MMHG

## 2021-06-02 DIAGNOSIS — Z09 ENCOUNTER FOR FOLLOW-UP EXAMINATION AFTER COMPLETED TREATMENT FOR CONDITIONS OTHER THAN MALIGNANT NEOPLASM: ICD-10-CM

## 2021-06-02 PROCEDURE — 99024 POSTOP FOLLOW-UP VISIT: CPT

## 2021-06-02 RX ORDER — METRONIDAZOLE 500 MG/1
500 TABLET ORAL
Qty: 3 | Refills: 0 | Status: DISCONTINUED | COMMUNITY
Start: 2021-05-04 | End: 2021-06-02

## 2021-06-02 RX ORDER — NEOMYCIN SULFATE 500 MG/1
500 TABLET ORAL
Qty: 6 | Refills: 0 | Status: DISCONTINUED | COMMUNITY
Start: 2021-05-04 | End: 2021-06-02

## 2021-06-02 NOTE — HISTORY OF PRESENT ILLNESS
[FreeTextEntry1] : 56 year old male who presents to the office s/p laparoscopic sigmoid resection for diverticulitis. Doing well. Tolerating PO, ambulating. Reports of some constipation. Appropriate incisional pain. Denies fever/chills, n/v.

## 2021-06-02 NOTE — PHYSICAL EXAM
[Abdomen Masses] : No abdominal masses [Abdomen Tenderness] : ~T No ~M abdominal tenderness [Respiratory Effort] : normal respiratory effort [No Rash or Lesion] : No rash or lesion [Alert] : alert [Oriented to Person] : oriented to person [Oriented to Place] : oriented to place [Oriented to Time] : oriented to time [Calm] : calm [de-identified] : soft, NDNT, surgical incision CDI, no erythema, drainage or odor; all staples were removed [de-identified] : well appearing, no acute distress  [de-identified] : NIRMAL x4 [de-identified] : warm and dry

## 2021-06-02 NOTE — ASSESSMENT
[FreeTextEntry1] : 56 year old male who is s/p lap sigmoid resection for diverticulitis. Doing well. Surgical site is w/o s/s of infection. All staples were removed. Surgical pathology was reviewed with patient. All questions and concerns addressed to his satisfaction. \par \par s/p lap sigmoid resection\par - regular diet\par - fiber, stool softeners\par - keep incisions clean and dry\par - daily showers with antibacterial soap\par - no heavy lifting for 4 more weeks\par \par RTO 2-3 months to f/u with Dr. Jordan

## 2021-06-02 NOTE — DATA REVIEWED
----- Message from Madison Miller sent at 10/16/2019  2:11 PM CDT -----  Contact: [facility]  Domonique Hernandez Jr.  MRN: [id number]  : 1941  PCP: Emiliano Cam  Home Phone      [phone]  Work Phone      Not on file.  Mobile          [phone]      MESSAGE:   Pt requesting refill or new Rx.   Is this a refill or new RX:  refill  RX name and strength: valsartan (DIOVAN) 80 MG tablet  Last office visit: 2019  Is this a 30-day or 90-day RX:  90-Day  Pharmacy name and location:  [facility]  Comments:      Phone:  [phone]   [[email or web address]] : surgical pathology reviewed - benign\par \par  Pathology             Final\par \par No Documents Attached\par \par \par \par \par   Kyle Accession Number : [id number]\par \par KRISTIN TOSCANO                         2\par \par \par \par Surgical Final Report\par \par \par \par \par Final Diagnosis\par \par 1. Colon, segmental resection (sigmoid with extensive\par diverticulitis, perforation and abscess):\par -Diverticular disease with acute diverticulitis, mural\par abscess site, organizing\par fat necrosis, granulation tissue, fibrosis, serositis and\par adhesions.\par -One reactive lymph node.\par \par 2. Colon, segmental resection (sigmoid with extensive\par diverticulitis):\par - Diverticular disease.\par \par 3. Tissue (colorectal anastomosis):\par -Viable colonic tissue.\par \par Verified by: CHRISSIE TREADWELL M.D.\par (Electronic Signature)\par Reported on: 05/24/21 17:03 EDT, [place] [address]\par Phone: [phone]   Fax: [phone]\par _________________________________________________________________\par \par Clinical History\par Sigmoid diverticulitis\par \par Specimen(s) Submitted\par 1     Sigmoid colon with extensive diverticulitis, perforation\par and abscess\par 2     Sigmoid colon with extensive diverticulitis\par 3     Colorectal anastomosis\par \par Gross Description\par 1. The specimen is received fresh labeled "sigmoid colon with\par extensive diverticulitis, perforation and abscess" and consists\par of a 15.0 cm in length by 3.9 cm in diameter segment of colon\par with a moderate amount of attached adipose tissue. It is received\par open at one margin and stapled at the opposite margin. The serosa\par is tan-brown to focally dull and shaggy. A stitch marks a 2.0 x\par 1.5 cm possible perforation/abscess site that is 6.0 cm from the\par nearest margin. Sectioning reveals a possible perforation/abscess\par site underlying the stitch. The mucosa is tan with normal folds\par and wall thickness is 0.4-1.0 cm.\par Representative sections in 5 cassettes:\par 1A-1B: Full-thickness stitch site, bisected\par 1C-1D: Full-thickness stitch site, bisected\par 1E: Representative diverticulum\par \par \par \par \par \par KRISTIN TOSCAON                         2\par \par \par \par Surgical Final Report\par \par \par \par \par \par 2. The specimen is received in formalin labeled "additional\par descending colon with diverticulitis"and consists of a 4.5 cm in\par length by 2.5 cm in diameter segment of colon with a moderate\par amount of attached adipose tissue. Sectioning reveals multiple\par diverticular openings.\par Representative sections in two cassettes: 2A-2B\par \par 3. Received: In formalin labeled "colorectal anastomosis"\par Size: Two, 2.0 x 1.5 x 1.0 cm and 2.1 x 1.7 x 1.1 cm\par Description: Pink-tan, congested, annular\par Cut Surface: Tan, soft with numerous staples and sutures\par Anvil: Present\par Submitted: Representative sections in one cassette: 3A\par \par Renita Valli 05/20/2021 10:50 AM\par \par Disclaimer\par In addition to other data that may appear on the specimen\par containers, all labels have been inspected to confirm the\par presence of the patient's name and date of birth.\par \par \par Surgical Consult Report\par \par \par \par \par Disclaimer\par In addition to other data that may appear on the specimen\par containers, all labels have been inspected to confirm the\par presence of the patient's name and date of birth.\par \par  \par \par  Ordered by: ANA CRUZ       Collected/Examined: [id number] 09:41AM       \par Verified by: ALE KATE [id number] 01:58PM       \par  Result Communication: No patient communication needed at this time;\par Stage: Final       \par  Performed at: [facility]       Resulted: [id number] 05:03PM       Last Updated: 25May2021 01:58PM       Accession: [id number]

## 2021-06-21 NOTE — PATIENT PROFILE ADULT - VISION (WITH CORRECTIVE LENSES IF THE PATIENT USUALLY WEARS THEM):
wears glasses/Partially impaired: cannot see medication labels or newsprint, but can see obstacles in path, and the surrounding layout; can count fingers at arm's length Infliximab Counseling:  I discussed with the patient the risks of infliximab including but not limited to myelosuppression, immunosuppression, autoimmune hepatitis, demyelinating diseases, lymphoma, and serious infections.  The patient understands that monitoring is required including a PPD at baseline and must alert us or the primary physician if symptoms of infection or other concerning signs are noted.

## 2022-03-17 NOTE — H&P PST ADULT - NS MD HP HEP C STATUS
March 17, 2022      Selvin Rockwell  [address] [place] [id number]      Dear Selvin,    You are currently under the care of [facility] for your warfarin (Coumadin ) therapy.  We are contacting you because our records show you were due for an INR on 2/10/22.    There are potentially serious risks when taking warfarin without careful monitoring and we want to make sure you are safely managed.  Routine INR monitoring is required for warfarin refills.     Please call [phone]  as soon as possible to schedule an appointment.  If there has been a change in your care or other concerns, please let us know so we can help and or update our records.     Sincerely,       [facility]                   Unknown

## 2023-10-15 NOTE — DISCHARGE NOTE PROVIDER - DISCHARGE DATE
RN CALLED [employer] AND SPOKE WITH OFELIA. PT IS A CANDIDATE FOR [employer] 
AND [employer] WILL CALL BACK IN ONE HR TO SEE IF PT IS A CORONERS CASE. [employer] 
NETWORK REFERENCE # [id number]. 19-May-2021